# Patient Record
Sex: MALE | Race: WHITE | NOT HISPANIC OR LATINO
[De-identification: names, ages, dates, MRNs, and addresses within clinical notes are randomized per-mention and may not be internally consistent; named-entity substitution may affect disease eponyms.]

---

## 2020-02-25 ENCOUNTER — APPOINTMENT (OUTPATIENT)
Dept: FAMILY MEDICINE | Facility: CLINIC | Age: 60
End: 2020-02-25
Payer: COMMERCIAL

## 2020-02-25 VITALS — SYSTOLIC BLOOD PRESSURE: 110 MMHG | DIASTOLIC BLOOD PRESSURE: 70 MMHG

## 2020-02-25 VITALS
SYSTOLIC BLOOD PRESSURE: 114 MMHG | HEIGHT: 72 IN | OXYGEN SATURATION: 97 % | DIASTOLIC BLOOD PRESSURE: 82 MMHG | HEART RATE: 75 BPM | WEIGHT: 198 LBS | BODY MASS INDEX: 26.82 KG/M2

## 2020-02-25 DIAGNOSIS — Z87.898 PERSONAL HISTORY OF OTHER SPECIFIED CONDITIONS: ICD-10-CM

## 2020-02-25 DIAGNOSIS — S89.91XA UNSPECIFIED INJURY OF RIGHT LOWER LEG, INITIAL ENCOUNTER: ICD-10-CM

## 2020-02-25 DIAGNOSIS — I49.3 VENTRICULAR PREMATURE DEPOLARIZATION: ICD-10-CM

## 2020-02-25 DIAGNOSIS — Z84.1 FAMILY HISTORY OF DISORDERS OF KIDNEY AND URETER: ICD-10-CM

## 2020-02-25 PROCEDURE — 99204 OFFICE O/P NEW MOD 45 MIN: CPT

## 2020-02-25 RX ORDER — PSYLLIUM HUSK 0.4 G
CAPSULE ORAL
Refills: 0 | Status: ACTIVE | COMMUNITY

## 2020-02-25 RX ORDER — HYDROCODONE BITARTRATE AND ACETAMINOPHEN 5; 300 MG/1; MG/1
TABLET ORAL
Refills: 0 | Status: ACTIVE | COMMUNITY

## 2020-02-25 RX ORDER — PNV NO.95/FERROUS FUM/FOLIC AC 28MG-0.8MG
TABLET ORAL
Refills: 0 | Status: ACTIVE | COMMUNITY

## 2020-02-25 RX ORDER — VITAMIN E ACID SUCCINATE 268 MG
TABLET ORAL
Refills: 0 | Status: ACTIVE | COMMUNITY

## 2020-02-25 RX ORDER — GLUCOSAM/MSM/C/MAN/WILLOW/GING 500-333.3
TABLET ORAL
Refills: 0 | Status: ACTIVE | COMMUNITY

## 2020-02-25 RX ORDER — CHROMIUM 200 MCG
TABLET ORAL
Refills: 0 | Status: ACTIVE | COMMUNITY

## 2020-02-25 NOTE — HEALTH RISK ASSESSMENT
[0] : 2) Feeling down, depressed, or hopeless: Not at all (0) [Yes] : Yes [2 - 4 times a month (2 pts)] : 2-4 times a month (2 points) [1 or 2 (0 pts)] : 1 or 2 (0 points) [Never (0 pts)] : Never (0 points) [No] : In the past 12 months have you used drugs other than those required for medical reasons? No [] : No [de-identified] : exercises regularly  [de-identified] : diet is good  [Audit-CScore] : 2

## 2020-02-25 NOTE — HISTORY OF PRESENT ILLNESS
[de-identified] : \par 59 year old male presents with his wife as a new patient to establish care\par \par Was away in [place]\par Had a fall on a wet floor in the lobby waiting for the elevator\par slipped and fell back wards\par developed sudden sharp pain to his right leg, left arm, left hip\par was taken via ambulance to a hospital in [facility]\par had x-rays done\par found to have a fracture to his right knee cap\par was placed in a cast to his right leg\par returned home last night\par had been prescribed Lovenox injections which his wife game to him \par since being home- has been off of Lovenox \par \par has bruising, pain, and swelling to his left arm- likely with torn ligament/tendon\par \par also with pain to his left leg- more posterior in location\par may be referred from his lower back or hip\par \par has appointment with orthopedics on Thursday- 2/27/2020 \par will look to get work up, imaging with orthopedics \par \par patient has chronic neck pain\par reports h/o cervical disc herniations\par has taken Vicodin at times for severe pain- not currently following with pain management\par will discuss pain medication with orthopedics\par \par has taken Diazepam previously as well for pain relief\par requests refill- has not been sleeping well due to the pain \par denies any reactions from this medication \par \par h/o PVCs\par follows with cardiology yearly\par on magnesium supplementation \par \par used to follow with Dr. Breezy Luke until he retired (came to find out he has passed away recently)\par was then seen by his nephew- did not wish to continue with the practice\par needs new PCP [FreeTextEntry1] : Establish care

## 2020-02-25 NOTE — ASSESSMENT
[FreeTextEntry1] : Fall, Injury to right knee (knee fracture), pain to left upper extremity, pain to left lower extremity- \par await orthopedics evaluation \par will defer management to orthopedics\par refill given for Diazepam, take as directed when needed,  reviewed\par patient has Meloxicam at home- to take when needed\par \par Chronic cervical pain:\par h/o herniated cervical discs\par consider pain management\par \par PVCs:\par follows with cardiology\par on Magnesium supplementation \par \par Health care maintenance:\par check blood work, blood drawn in office\par recommend GI follow up for colonoscopy \par up to date with Flu vaccine \par \par advised to sign release to obtain previous medical records

## 2020-02-25 NOTE — PHYSICAL EXAM
[No Acute Distress] : no acute distress [Well Nourished] : well nourished [PERRL] : pupils equal round and reactive to light [Normal Sclera/Conjunctiva] : normal sclera/conjunctiva [Well Developed] : well developed [Normal Oropharynx] : the oropharynx was normal [Normal TMs] : both tympanic membranes were normal [Normal Appearance] : was normal in appearance [Normal] : the thyroid was normal [Neck Supple] : was supple [No Respiratory Distress] : no respiratory distress  [Clear to Auscultation] : lungs were clear to auscultation bilaterally [Normal Rate] : normal rate  [Regular Rhythm] : with a regular rhythm [Normal S1, S2] : normal S1 and S2 [No Murmur] : no murmur heard [No Carotid Bruits] : no carotid bruits [No Edema] : there was no peripheral edema [Soft] : abdomen soft [Non Tender] : non-tender [Normal Bowel Sounds] : normal bowel sounds [Normal Posterior Cervical Nodes] : no posterior cervical lymphadenopathy [Normal Anterior Cervical Nodes] : no anterior cervical lymphadenopathy [No Spinal Tenderness] : no spinal tenderness [Alert and Oriented x3] : oriented to person, place, and time [de-identified] : right leg in cast; tenderness to left upper arm along with swelling; tenderness to left posterior leg [de-identified] : bruising and swelling to left upper extremity

## 2020-02-26 DIAGNOSIS — D64.9 ANEMIA, UNSPECIFIED: ICD-10-CM

## 2020-02-28 ENCOUNTER — NON-APPOINTMENT (OUTPATIENT)
Age: 60
End: 2020-02-28

## 2020-02-28 ENCOUNTER — APPOINTMENT (OUTPATIENT)
Dept: FAMILY MEDICINE | Facility: CLINIC | Age: 60
End: 2020-02-28
Payer: COMMERCIAL

## 2020-02-28 VITALS
RESPIRATION RATE: 16 BRPM | SYSTOLIC BLOOD PRESSURE: 122 MMHG | DIASTOLIC BLOOD PRESSURE: 74 MMHG | WEIGHT: 203 LBS | HEART RATE: 56 BPM | HEIGHT: 74 IN | OXYGEN SATURATION: 98 % | BODY MASS INDEX: 26.05 KG/M2
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
## 2020-02-28 LAB
25(OH)D3 SERPL-MCNC: 42.5 NG/ML
ALBUMIN SERPL ELPH-MCNC: 4.7 G/DL
ALP BLD-CCNC: 49 U/L
ALT SERPL-CCNC: 74 U/L
ANION GAP SERPL CALC-SCNC: 16 MMOL/L
AST SERPL-CCNC: 72 U/L
BASOPHILS # BLD AUTO: 0.07 K/UL
BASOPHILS NFR BLD AUTO: 0.9 %
BILIRUB SERPL-MCNC: 0.9 MG/DL
BUN SERPL-MCNC: 17 MG/DL
CALCIUM SERPL-MCNC: 8.8 MG/DL
CHLORIDE SERPL-SCNC: 105 MMOL/L
CHOLEST SERPL-MCNC: 220 MG/DL
CHOLEST/HDLC SERPL: 4.5 RATIO
CO2 SERPL-SCNC: 23 MMOL/L
CREAT SERPL-MCNC: 0.78 MG/DL
EOSINOPHIL # BLD AUTO: 0.24 K/UL
EOSINOPHIL NFR BLD AUTO: 3 %
ESTIMATED AVERAGE GLUCOSE: 120 MG/DL
FERRITIN SERPL-MCNC: 430 NG/ML
FOLATE SERPL-MCNC: >20 NG/ML
GLUCOSE SERPL-MCNC: 94 MG/DL
HAV IGM SER QL: NONREACTIVE
HBA1C MFR BLD HPLC: 5.8 %
HBV CORE IGM SER QL: NONREACTIVE
HBV SURFACE AG SER QL: NONREACTIVE
HCT VFR BLD CALC: 39.7 %
HCV AB SER QL: NONREACTIVE
HCV S/CO RATIO: 0.12 S/CO
HDLC SERPL-MCNC: 49 MG/DL
HGB BLD-MCNC: 12.3 G/DL
IMM GRANULOCYTES NFR BLD AUTO: 0.8 %
IRON SATN MFR SERPL: 38 %
IRON SERPL-MCNC: 122 UG/DL
LDLC SERPL CALC-MCNC: 143 MG/DL
LYMPHOCYTES # BLD AUTO: 1.81 K/UL
LYMPHOCYTES NFR BLD AUTO: 22.8 %
MAN DIFF?: NORMAL
MCHC RBC-ENTMCNC: 22.7 PG
MCHC RBC-ENTMCNC: 31 GM/DL
MCV RBC AUTO: 73.1 FL
MONOCYTES # BLD AUTO: 0.71 K/UL
MONOCYTES NFR BLD AUTO: 8.9 %
NEUTROPHILS # BLD AUTO: 5.06 K/UL
NEUTROPHILS NFR BLD AUTO: 63.6 %
PLATELET # BLD AUTO: 200 K/UL
POTASSIUM SERPL-SCNC: 4.4 MMOL/L
PROT SERPL-MCNC: 6.8 G/DL
PSA SERPL-MCNC: 0.98 NG/ML
RBC # BLD: 5.43 M/UL
RBC # FLD: 17.6 %
SODIUM SERPL-SCNC: 143 MMOL/L
T4 FREE SERPL-MCNC: 0.9 NG/DL
TIBC SERPL-MCNC: 322 UG/DL
TRIGL SERPL-MCNC: 139 MG/DL
TSH SERPL-ACNC: 2.99 UIU/ML
UIBC SERPL-MCNC: 200 UG/DL
URATE SERPL-MCNC: 6.9 MG/DL
VIT B12 SERPL-MCNC: 689 PG/ML
WBC # FLD AUTO: 7.95 K/UL

## 2020-02-28 PROCEDURE — 93000 ELECTROCARDIOGRAM COMPLETE: CPT

## 2020-02-28 PROCEDURE — 99214 OFFICE O/P EST MOD 30 MIN: CPT | Mod: 25

## 2020-02-28 NOTE — PHYSICAL EXAM
[No Acute Distress] : no acute distress [Well-Appearing] : well-appearing [Normal Sclera/Conjunctiva] : normal sclera/conjunctiva [PERRL] : pupils equal round and reactive to light [Normal Outer Ear/Nose] : the outer ears and nose were normal in appearance [No Lymphadenopathy] : no lymphadenopathy [Normal Oropharynx] : the oropharynx was normal [No Respiratory Distress] : no respiratory distress  [Supple] : supple [No Accessory Muscle Use] : no accessory muscle use [Clear to Auscultation] : lungs were clear to auscultation bilaterally [Normal Rate] : normal rate  [Regular Rhythm] : with a regular rhythm [Normal S1, S2] : normal S1 and S2 [Soft] : abdomen soft [Non Tender] : non-tender [Non-distended] : non-distended [No Rash] : no rash [Normal Bowel Sounds] : normal bowel sounds [No Focal Deficits] : no focal deficits [Normal Affect] : the affect was normal [Normal Insight/Judgement] : insight and judgment were intact

## 2020-03-02 LAB
APPEARANCE: CLEAR
APTT BLD: 29.2 SEC
BILIRUBIN URINE: NEGATIVE
BLOOD URINE: NEGATIVE
COLOR: YELLOW
GLUCOSE QUALITATIVE U: NEGATIVE
INR PPP: 1.01 RATIO
KETONES URINE: NEGATIVE
LEUKOCYTE ESTERASE URINE: NEGATIVE
NITRITE URINE: NEGATIVE
PH URINE: 6
PROTEIN URINE: NORMAL
PT BLD: 11.4 SEC
SPECIFIC GRAVITY URINE: 1.03
TESTOST BND SERPL-MCNC: 5.9 PG/ML
TESTOST SERPL-MCNC: 157 NG/DL
UROBILINOGEN URINE: NORMAL

## 2020-03-02 NOTE — PLAN
[FreeTextEntry1] : Moderate risk pt for intermediate risk procedure. Mild anemia is noted on labs with h/h 12.3/39.7 which is likely secondary to hematoma. He is medically optimized at this time pending coagulation studies.\par Advised to stop taking vitamins and NSAIDS.

## 2020-03-02 NOTE — REVIEW OF SYSTEMS
[Joint Pain] : joint pain [Muscle Pain] : muscle pain [Fever] : no fever [Fatigue] : no fatigue [Chills] : no chills [Vision Problems] : no vision problems [Discharge] : no discharge [Nasal Discharge] : no nasal discharge [Earache] : no earache [Chest Pain] : no chest pain [Sore Throat] : no sore throat [Palpitations] : no palpitations [Shortness Of Breath] : no shortness of breath [Wheezing] : no wheezing [Abdominal Pain] : no abdominal pain [Cough] : no cough [Nausea] : no nausea [Diarrhea] : no diarrhea [Vomiting] : no vomiting [Dysuria] : no dysuria [Hematuria] : no hematuria [Headache] : no headache [Dizziness] : no dizziness [Anxiety] : no anxiety [Depression] : no depression [Fainting] : no fainting [Easy Bruising] : no easy bruising [Easy Bleeding] : no easy bleeding

## 2020-03-02 NOTE — RESULTS/DATA
[] : results reviewed [de-identified] : mild anemia likely secondary to hematoma  [de-identified] : NSR at 65bpm without ST/T wave changes

## 2020-03-02 NOTE — ASSESSMENT
[Patient Requires Further Testing] : Patient requires further testing [As per surgery] : as per surgery [FreeTextEntry4] : Moderate risk pt for intermediate risk procedure. Mild anemia is noted on labs with h/h 12.3/39.7 which is likely secondary to hematoma. He is medically optimized at this time pending coagulation studies.\par

## 2020-03-02 NOTE — HISTORY OF PRESENT ILLNESS
[No Pertinent Cardiac History] : no history of aortic stenosis, atrial fibrillation, coronary artery disease, recent myocardial infarction, or implantable device/pacemaker [No Pertinent Pulmonary History] : no history of asthma, COPD, sleep apnea, or smoking [No Adverse Anesthesia Reaction] : no adverse anesthesia reaction in self or family member [(Patient denies any chest pain, claudication, dyspnea on exertion, orthopnea, palpitations or syncope)] : Patient denies any chest pain, claudication, dyspnea on exertion, orthopnea, palpitations or syncope [Excellent (>10 METs)] : Excellent (>10 METs) [Aortic Stenosis] : no aortic stenosis [Coronary Artery Disease] : no coronary artery disease [Atrial Fibrillation] : no atrial fibrillation [Recent Myocardial Infarction] : no recent myocardial infarction [Implantable Device/Pacemaker] : no implantable device/pacemaker [Asthma] : no asthma [COPD] : no COPD [Smoker] : not a smoker [Sleep Apnea] : no sleep apnea [Self] : no previous adverse anesthesia reaction [Chronic Anticoagulation] : no chronic anticoagulation [Diabetes] : no diabetes [Chronic Kidney Disease] : no chronic kidney disease [FreeTextEntry1] : L tricep, R quad repair  [FreeTextEntry3] : Dr. Goyo Aleman  [FreeTextEntry4] : 59 year male presents for medical clearance for urgent surgery for L triceps and R quadriceps repair. States he was on vacation and slipped on wet floor in elevator, now with above injuries requiring immediate surgery. He otherwise feels well. He is extremely athletic and exercises 5-6x a week. Walked 7 miles in one day on the beach prior to injury. Can walk up multiple fights of stairs without chest pain or SOB. He has a hx of PVCs for which he saw cardiology for in 2015 and now takes magnesium supplementation. Has not had cardiac issue in past 5 years since taking supplements. He takes diazepam PRN for sleep at night, does not take regularly.  [FreeTextEntry2] : 3/2/20

## 2020-06-30 ENCOUNTER — APPOINTMENT (OUTPATIENT)
Dept: FAMILY MEDICINE | Facility: CLINIC | Age: 60
End: 2020-06-30
Payer: COMMERCIAL

## 2020-06-30 VITALS
WEIGHT: 193 LBS | RESPIRATION RATE: 16 BRPM | BODY MASS INDEX: 24.77 KG/M2 | HEIGHT: 74 IN | HEART RATE: 77 BPM | OXYGEN SATURATION: 98 % | DIASTOLIC BLOOD PRESSURE: 72 MMHG | SYSTOLIC BLOOD PRESSURE: 122 MMHG

## 2020-06-30 DIAGNOSIS — M79.605 PAIN IN LEFT LEG: ICD-10-CM

## 2020-06-30 PROCEDURE — 99214 OFFICE O/P EST MOD 30 MIN: CPT

## 2020-06-30 RX ORDER — DIAZEPAM 10 MG/1
10 TABLET ORAL
Qty: 30 | Refills: 0 | Status: COMPLETED | COMMUNITY
Start: 2019-12-27 | End: 2020-06-30

## 2020-06-30 RX ORDER — OMEGA-3/DHA/EPA/FISH OIL 300-1000MG
400 CAPSULE ORAL
Qty: 90 | Refills: 3 | Status: ACTIVE | COMMUNITY
Start: 2020-06-30

## 2020-07-02 PROBLEM — M79.605 PAIN OF LEFT LOWER EXTREMITY: Status: ACTIVE | Noted: 2020-02-25

## 2020-07-02 NOTE — HISTORY OF PRESENT ILLNESS
[de-identified] : Pt recuperating from surgical repair of L tricep and R quad repair in 3/2/20. Pt going to physical therapy. Pt had been prescribed dilaudid but now off opioid meds. Pt taking tylenol daily for pain control. Pt has R low back pain w/ radiculopathy, MRI lumbar spine is pending. Pt on valium prn at night to help w/ spasms. \par Pt has BPH w/ nocturia 3x a night. pt had been on sildenafil 20mg every other day which helped symptoms in the past.

## 2020-07-02 NOTE — ASSESSMENT
[FreeTextEntry1] : BPH - cialis 5mg q day ordered. pt reports sildenafil 20mg every other day was effective in the past\par cervical disc herniation, pain LLE, LUE, spasms - refill diazepam prn\par

## 2020-09-22 ENCOUNTER — APPOINTMENT (OUTPATIENT)
Dept: FAMILY MEDICINE | Facility: CLINIC | Age: 60
End: 2020-09-22
Payer: COMMERCIAL

## 2020-09-22 VITALS
HEIGHT: 74 IN | DIASTOLIC BLOOD PRESSURE: 88 MMHG | SYSTOLIC BLOOD PRESSURE: 120 MMHG | OXYGEN SATURATION: 98 % | WEIGHT: 193 LBS | BODY MASS INDEX: 24.77 KG/M2 | HEART RATE: 69 BPM | TEMPERATURE: 97.7 F

## 2020-09-22 DIAGNOSIS — R51 HEADACHE: ICD-10-CM

## 2020-09-22 DIAGNOSIS — H53.9 UNSPECIFIED VISUAL DISTURBANCE: ICD-10-CM

## 2020-09-22 PROCEDURE — 99214 OFFICE O/P EST MOD 30 MIN: CPT

## 2020-09-22 NOTE — HISTORY OF PRESENT ILLNESS
[FreeTextEntry8] : Pt c/o vision changes, sensitivity to light, new onset headaches that started after falling and hitting his head in 2/2020. Pt has poor ability to focus w/ his R eye vision. \par Pt still c/o BPH symptoms of nocturia 3+ times a night. Pt has not tried cialis as it cost to much. Pt reports he was on sildenafil every other day w/ good results. Pt hesitant to start flomax as he is worried about AEs.\par \par Pt still in significant pain from R quadricep repair in 03/2020. Pt has been going to PT with minimal improvement.

## 2020-09-22 NOTE — ASSESSMENT
[FreeTextEntry1] : new onset headaches, vision changes- pt to see ophtho for eval. if eye exam wnl will order imaging of head\par BPH - Trial of sildenafil every other day. Advised pt half life may not last to next day and that cialis 5mg q day or flomax qhs may be a better option in the future. Refer to uro

## 2021-01-07 ENCOUNTER — APPOINTMENT (OUTPATIENT)
Dept: FAMILY MEDICINE | Facility: CLINIC | Age: 61
End: 2021-01-07
Payer: COMMERCIAL

## 2021-01-07 DIAGNOSIS — J01.90 ACUTE SINUSITIS, UNSPECIFIED: ICD-10-CM

## 2021-01-07 DIAGNOSIS — U07.1 COVID-19: ICD-10-CM

## 2021-01-07 PROCEDURE — 99213 OFFICE O/P EST LOW 20 MIN: CPT | Mod: 95

## 2021-01-07 NOTE — HISTORY OF PRESENT ILLNESS
[Home] : at home, [unfilled] , at the time of the visit. [Medical Office: ([facility])___] : at the medical office located in  [Verbal consent obtained from patient] : the patient, [unfilled] [FreeTextEntry8] : covid positve stable no fever or respiratory  issues independent

## 2021-01-07 NOTE — REVIEW OF SYSTEMS
[Postnasal Drip] : postnasal drip [Negative] : ENT [Nasal Discharge] : no nasal discharge [Hoarseness] : no hoarseness

## 2021-01-09 ENCOUNTER — TRANSCRIPTION ENCOUNTER (OUTPATIENT)
Age: 61
End: 2021-01-09

## 2021-04-17 ENCOUNTER — TRANSCRIPTION ENCOUNTER (OUTPATIENT)
Age: 61
End: 2021-04-17

## 2021-05-20 ENCOUNTER — APPOINTMENT (OUTPATIENT)
Dept: FAMILY MEDICINE | Facility: CLINIC | Age: 61
End: 2021-05-20
Payer: COMMERCIAL

## 2021-05-20 VITALS
TEMPERATURE: 97.8 F | HEART RATE: 86 BPM | DIASTOLIC BLOOD PRESSURE: 80 MMHG | WEIGHT: 192 LBS | HEIGHT: 74 IN | OXYGEN SATURATION: 98 % | SYSTOLIC BLOOD PRESSURE: 130 MMHG | BODY MASS INDEX: 24.64 KG/M2

## 2021-05-20 DIAGNOSIS — I49.9 CARDIAC ARRHYTHMIA, UNSPECIFIED: ICD-10-CM

## 2021-05-20 DIAGNOSIS — L30.9 DERMATITIS, UNSPECIFIED: ICD-10-CM

## 2021-05-20 DIAGNOSIS — S10.96XA INSECT BITE OF UNSPECIFIED PART OF NECK, INITIAL ENCOUNTER: ICD-10-CM
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
## 2021-05-20 DIAGNOSIS — Z23 ENCOUNTER FOR IMMUNIZATION: ICD-10-CM

## 2021-05-20 PROCEDURE — 90471 IMMUNIZATION ADMIN: CPT

## 2021-05-20 PROCEDURE — 99072 ADDL SUPL MATRL&STAF TM PHE: CPT

## 2021-05-20 PROCEDURE — 90715 TDAP VACCINE 7 YRS/> IM: CPT

## 2021-05-20 PROCEDURE — 99213 OFFICE O/P EST LOW 20 MIN: CPT | Mod: 25

## 2021-05-20 PROCEDURE — 99396 PREV VISIT EST AGE 40-64: CPT | Mod: 25

## 2021-05-20 NOTE — HEALTH RISK ASSESSMENT
[Patient reported colonoscopy was abnormal] : Patient reported colonoscopy was abnormal [ColonoscopyDate] : 09/20 [ColonoscopyComments] : 3 adenomas, repeat in 2023

## 2021-05-20 NOTE — HISTORY OF PRESENT ILLNESS
[de-identified] : Pt in office for CPE. Pt has been feeling well. \par Pt has hxfrom surgical repair of L tricep and R quad repair in 3/2/20. Pt taking tylenol prn for pain control. Pt still has torn L labrum and is awaiting approval from insurance. Pt also has chronic neck pain. Pt on valium prn at night to help w/ spasms. \par Pt found tick attached to his neck 2 weeks ago. Denies new arthralgias/myalgias.\par Pt c/o rash on penis first noticed 2 days ago. Pt had started amoxicillin a few days prior, pt has hx of PCN allergy. Pt c/o headache that started 2-3 weeks ago. Denies itching or pain, fever, chills. \par Pt has hx of\par Pt sees cardio Dr. Triplett for hx cardiac arrhythmia, palpitations. Symptoms improved after starting magnesium supplement. \par Denies CP, palpitations, dyspnea, n/v.\par Nonsmoker\par ETOH use social \par Drug use denies\par Exercises 4-5x a week weights or biking\par Works as real estate company owner\par

## 2021-05-20 NOTE — ASSESSMENT
[FreeTextEntry1] : L shoulder pain, neck spasms - diazepam prn.  checked\par BPH - check PSA, refer to uro\par dermatitis of penis - trial of topical betamethasone x 1 week. if no improvement see derm\par tick bite of neck - tick borne disease panel ordered\par Healthy diet and regular exercise regimen discussed w/ pt.\par Screening labs ordered\par tdap administered, pt consent given before administration and after discussion of risks/benefits, pt tolerated well\par Any questions call office

## 2021-05-24 ENCOUNTER — TRANSCRIPTION ENCOUNTER (OUTPATIENT)
Age: 61
End: 2021-05-24

## 2021-05-24 LAB
A PHAGOCYTOPH IGG TITR SER IF: NORMAL TITER
ALBUMIN SERPL ELPH-MCNC: 4.9 G/DL
ALP BLD-CCNC: 48 U/L
ALT SERPL-CCNC: 17 U/L
ANION GAP SERPL CALC-SCNC: 16 MMOL/L
APPEARANCE: CLEAR
AST SERPL-CCNC: 16 U/L
B BURGDOR AB SER QL IA: NEGATIVE
B MICROTI IGG TITR SER: NORMAL TITER
BACTERIA: NEGATIVE
BASOPHILS # BLD AUTO: 0.05 K/UL
BASOPHILS NFR BLD AUTO: 0.8 %
BILIRUB SERPL-MCNC: 1.1 MG/DL
BILIRUBIN URINE: NEGATIVE
BLOOD URINE: NEGATIVE
BUN SERPL-MCNC: 19 MG/DL
CALCIUM OXALATE CRYSTALS: ABNORMAL
CALCIUM SERPL-MCNC: 9.2 MG/DL
CHLORIDE SERPL-SCNC: 101 MMOL/L
CHOLEST SERPL-MCNC: 176 MG/DL
CO2 SERPL-SCNC: 24 MMOL/L
COLOR: YELLOW
CREAT SERPL-MCNC: 0.99 MG/DL
E CHAFFEENSIS IGG TITR SER IF: NORMAL TITER
EOSINOPHIL # BLD AUTO: 0.26 K/UL
EOSINOPHIL NFR BLD AUTO: 4.3 %
ESTIMATED AVERAGE GLUCOSE: 120 MG/DL
GLUCOSE QUALITATIVE U: NEGATIVE
GLUCOSE SERPL-MCNC: 103 MG/DL
HBA1C MFR BLD HPLC: 5.8 %
HCT VFR BLD CALC: 45.2 %
HDLC SERPL-MCNC: 45 MG/DL
HGB BLD-MCNC: 13.5 G/DL
HYALINE CASTS: 1 /LPF
IMM GRANULOCYTES NFR BLD AUTO: 0.3 %
KETONES URINE: NEGATIVE
LDLC SERPL CALC-MCNC: 114 MG/DL
LEUKOCYTE ESTERASE URINE: NEGATIVE
LYMPHOCYTES # BLD AUTO: 1.43 K/UL
LYMPHOCYTES NFR BLD AUTO: 23.4 %
MAN DIFF?: NORMAL
MCHC RBC-ENTMCNC: 21.8 PG
MCHC RBC-ENTMCNC: 29.9 GM/DL
MCV RBC AUTO: 72.9 FL
MICROSCOPIC-UA: NORMAL
MONOCYTES # BLD AUTO: 0.66 K/UL
MONOCYTES NFR BLD AUTO: 10.8 %
NEUTROPHILS # BLD AUTO: 3.69 K/UL
NEUTROPHILS NFR BLD AUTO: 60.4 %
NITRITE URINE: NEGATIVE
NONHDLC SERPL-MCNC: 131 MG/DL
PH URINE: 6
PLATELET # BLD AUTO: 199 K/UL
POTASSIUM SERPL-SCNC: 4.3 MMOL/L
PROT SERPL-MCNC: 7.3 G/DL
PROTEIN URINE: ABNORMAL
PSA SERPL-MCNC: 1.32 NG/ML
RBC # BLD: 6.2 M/UL
RBC # FLD: 17.5 %
RED BLOOD CELLS URINE: 1 /HPF
SODIUM SERPL-SCNC: 140 MMOL/L
SPECIFIC GRAVITY URINE: 1.03
SQUAMOUS EPITHELIAL CELLS: 0 /HPF
T4 FREE SERPL-MCNC: 1 NG/DL
TESTOST SERPL-MCNC: 157 NG/DL
TRIGL SERPL-MCNC: 82 MG/DL
TSH SERPL-ACNC: 2.39 UIU/ML
UROBILINOGEN URINE: NORMAL
WBC # FLD AUTO: 6.11 K/UL
WHITE BLOOD CELLS URINE: 2 /HPF

## 2021-08-03 ENCOUNTER — TRANSCRIPTION ENCOUNTER (OUTPATIENT)
Age: 61
End: 2021-08-03

## 2021-08-05 ENCOUNTER — APPOINTMENT (OUTPATIENT)
Dept: FAMILY MEDICINE | Facility: CLINIC | Age: 61
End: 2021-08-05
Payer: COMMERCIAL

## 2021-08-05 VITALS
HEART RATE: 87 BPM | HEIGHT: 74 IN | TEMPERATURE: 97.5 F | SYSTOLIC BLOOD PRESSURE: 122 MMHG | OXYGEN SATURATION: 97 % | BODY MASS INDEX: 24.77 KG/M2 | WEIGHT: 193 LBS | DIASTOLIC BLOOD PRESSURE: 78 MMHG

## 2021-08-05 DIAGNOSIS — M25.50 PAIN IN UNSPECIFIED JOINT: ICD-10-CM
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
## 2021-08-05 DIAGNOSIS — S30.861A INSECT BITE (NONVENOMOUS) OF ABDOMINAL WALL, INITIAL ENCOUNTER: ICD-10-CM

## 2021-08-05 PROCEDURE — 99214 OFFICE O/P EST MOD 30 MIN: CPT

## 2021-08-12 NOTE — HISTORY OF PRESENT ILLNESS
[FreeTextEntry8] : Pt c/o generalized arthralgias, myalgias for past 4 days. Pt has worsened neck pain and spasms as well. Pt uses valium prn. Pt was taking motrin prn. Pt went to urgent care on 8/3/21, COVID rapid test was neg. Symptoms have improved today. Pt reports removing tick from R groin 2-3 weeks ago. Denies fever, chills, n/v, abd pain, sinus/throat pain, cough, dyspnea, CP.\par Pt also c/o 1-2 months of L lateral rib pains. Denies dyspnea or relation to activity. Breath sounds clear and equal bilaterally. (3) walks occasionally

## 2021-08-12 NOTE — PHYSICAL EXAM
[Soft] : abdomen soft [Non Tender] : non-tender [Non-distended] : non-distended [No Rash] : no rash [Normal] : affect was normal and insight and judgment were intact [de-identified] : no reproducible rib or chest pain on palpation

## 2021-08-12 NOTE — ASSESSMENT
[FreeTextEntry1] : polyarthralgias, hx tick bite of groin - lyme test in 2-3 weeks. As symptoms are improving may possibly be viral, will hold off on antibx for now. If symptoms worsen will start doxy course.\par \par Addendum 8/12/21\par Patient condition worsened, symptoms worsened. start doxycycline 100mg bid x 21 days for presumed lyme

## 2021-08-21 ENCOUNTER — INPATIENT (INPATIENT)
Facility: HOSPITAL | Age: 61
LOS: 3 days | Discharge: HOME HEALTH | End: 2021-08-25
Attending: FAMILY MEDICINE
Payer: COMMERCIAL

## 2021-08-21 DIAGNOSIS — S46.311A STRAIN OF MUSCLE, FASCIA AND TENDON OF TRICEPS, RIGHT ARM, INITIAL ENCOUNTER: Chronic | ICD-10-CM

## 2021-08-21 DIAGNOSIS — T14.8 OTHER INJURY OF UNSPECIFIED BODY REGION: Chronic | ICD-10-CM

## 2021-08-21 DIAGNOSIS — Z98.89 OTHER SPECIFIED POSTPROCEDURAL STATES: Chronic | ICD-10-CM

## 2021-08-21 PROCEDURE — 71045 X-RAY EXAM CHEST 1 VIEW: CPT | Mod: 26

## 2021-08-21 PROCEDURE — 99284 EMERGENCY DEPT VISIT MOD MDM: CPT

## 2021-08-21 PROCEDURE — 70450 CT HEAD/BRAIN W/O DYE: CPT | Mod: 26

## 2021-08-21 PROCEDURE — 93010 ELECTROCARDIOGRAM REPORT: CPT

## 2021-08-30 ENCOUNTER — NON-APPOINTMENT (OUTPATIENT)
Age: 61
End: 2021-08-30

## 2021-09-07 ENCOUNTER — APPOINTMENT (OUTPATIENT)
Dept: FAMILY MEDICINE | Facility: CLINIC | Age: 61
End: 2021-09-07
Payer: COMMERCIAL

## 2021-09-07 VITALS — DIASTOLIC BLOOD PRESSURE: 84 MMHG | SYSTOLIC BLOOD PRESSURE: 112 MMHG

## 2021-09-07 VITALS
WEIGHT: 196 LBS | BODY MASS INDEX: 25.15 KG/M2 | TEMPERATURE: 97.7 F | HEART RATE: 80 BPM | OXYGEN SATURATION: 98 % | SYSTOLIC BLOOD PRESSURE: 120 MMHG | HEIGHT: 74 IN | DIASTOLIC BLOOD PRESSURE: 80 MMHG

## 2021-09-07 DIAGNOSIS — M54.2 CERVICALGIA: ICD-10-CM

## 2021-09-07 DIAGNOSIS — G89.29 CERVICALGIA: ICD-10-CM

## 2021-09-07 PROCEDURE — 99495 TRANSJ CARE MGMT MOD F2F 14D: CPT

## 2021-09-08 PROBLEM — M54.2 CHRONIC CERVICAL PAIN: Status: ACTIVE | Noted: 2020-02-25

## 2021-09-08 NOTE — ASSESSMENT
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  checked\par insomnia - refill ambien prn. Advised not to take ambien if he had already taken diazepam that day due to risk of interactions. Pt understands on repeat back.  checked

## 2021-09-08 NOTE — HISTORY OF PRESENT ILLNESS
[Post-hospitalization from ___ Hospital] : Post-hospitalization from [unfilled] Hospital [Admitted on: ___] : The patient was admitted on [unfilled] [Discharged on ___] : discharged on [unfilled] [Discharge Summary] : discharge summary [Pertinent Labs] : pertinent labs [Radiology Findings] : radiology findings [Discharge Med List] : discharge medication list [Med Reconciliation] : medication reconciliation has been completed [Patient Contacted By: ____] : and contacted by [unfilled] [FreeTextEntry2] : Pt was admitted for dizziness, blurry vision, headaches. 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 Pt to see ID Dr. Gamble for followup. Pt on IV ceftriaxone until 9/20/21. Pt still has residual lightheadedness at times when getting up quickly and feels fatigued.\par \par Pt also f/u chronic cervical and shoulder spasms/pain. Pt uses diazepam prn spasms. Pt f/u insomnia, uses ambien rarely.

## 2021-09-08 NOTE — PHYSICAL EXAM
[Soft] : abdomen soft [Non Tender] : non-tender [No Rash] : no rash [No Focal Deficits] : no focal deficits [Normal Gait] : normal gait [Normal] : affect was normal and insight and judgment were intact

## 2021-10-05 ENCOUNTER — APPOINTMENT (OUTPATIENT)
Dept: UROLOGY | Facility: CLINIC | Age: 61
End: 2021-10-05
Payer: COMMERCIAL

## 2021-10-05 VITALS
HEART RATE: 73 BPM | HEIGHT: 74 IN | BODY MASS INDEX: 25.15 KG/M2 | DIASTOLIC BLOOD PRESSURE: 84 MMHG | WEIGHT: 196 LBS | SYSTOLIC BLOOD PRESSURE: 147 MMHG

## 2021-10-05 DIAGNOSIS — N13.8 BENIGN PROSTATIC HYPERPLASIA WITH LOWER URINARY TRACT SYMPMS: ICD-10-CM

## 2021-10-05 DIAGNOSIS — R35.1 NOCTURIA: ICD-10-CM

## 2021-10-05 DIAGNOSIS — N40.1 BENIGN PROSTATIC HYPERPLASIA WITH LOWER URINARY TRACT SYMPMS: ICD-10-CM

## 2021-10-05 PROCEDURE — 99204 OFFICE O/P NEW MOD 45 MIN: CPT

## 2021-10-05 PROCEDURE — 51798 US URINE CAPACITY MEASURE: CPT

## 2021-10-06 LAB
APPEARANCE: CLEAR
BILIRUBIN URINE: NEGATIVE
BLOOD URINE: NEGATIVE
COLOR: YELLOW
GLUCOSE QUALITATIVE U: NEGATIVE
KETONES URINE: NEGATIVE
LEUKOCYTE ESTERASE URINE: NEGATIVE
NITRITE URINE: NEGATIVE
PH URINE: 6
PROTEIN URINE: NEGATIVE
SPECIFIC GRAVITY URINE: 1.02
UROBILINOGEN URINE: NORMAL

## 2021-10-13 PROBLEM — R35.1 NOCTURIA: Status: ACTIVE | Noted: 2021-10-13

## 2021-10-13 NOTE — ASSESSMENT
[FreeTextEntry1] : Benign Prostatic Hyperplasia:\par PVR: 32 ml. \par Discussed treatment options. Will like to hold off considering his dizziness issues. \par Will get Urinalysis with reflex Urine culture. \par \par Nocturia:\par Asked Pt to limit PM fluid intake. \par \par Hypogonadism:\par Discussed risks and benefits of Testosterone replacement therapy including effect on spermatogenesis. \par Will consider. \par Will get Total, Free and Bio available Testosterone. \par \par PSA Screening:\par Discussed PSA screening and latest recommendations/guidelines- USPTF and AUA. \par Continue PSA Screening. \par \par Return to office in 2 months or sooner if any issues: will do Uroflo/PVR.

## 2021-10-13 NOTE — HISTORY OF PRESENT ILLNESS
[FreeTextEntry1] : 61 year old male presents for urinary frequency. \par Reports reasonable stream, urinates every 2-4 hours or so during the day. Nocturia of 3-5 x. \par Denies hesitancy, straining, intermittency, urgency, incontinence, sense of incomplete emptying.\par Denies dysuria, hematuria, lower abdominal or flank pain, fever, chills or rigors.\par Normal erections and libido. \par Family history of Prostate cancer: ? Adopted.\par Son has history of kidney stone. \par \par Recently had Meningitis. Has dizziness since diagnosed. \par  \par

## 2021-10-13 NOTE — PHYSICAL EXAM
[Normal Appearance] : normal appearance [General Appearance - In No Acute Distress] : no acute distress [] : no respiratory distress [Abdomen Soft] : soft [Abdomen Tenderness] : non-tender [Costovertebral Angle Tenderness] : no ~M costovertebral angle tenderness [Urethral Meatus] : meatus normal [Penis Abnormality] : normal uncircumcised penis [Scrotum] : the scrotum was normal [Epididymis] : the epididymides were normal [Testes Tenderness] : no tenderness of the testes [Normal Station and Gait] : the gait and station were normal for the patient's age [Skin Color & Pigmentation] : normal skin color and pigmentation [No Focal Deficits] : no focal deficits [Oriented To Time, Place, And Person] : oriented to person, place, and time [Prostate Tenderness] : the prostate was not tender [No Prostate Nodules] : no prostate nodules [Prostate Size ___ (0-4)] : prostate size [unfilled] (scale: 0-4) [FreeTextEntry1] : left varicocele

## 2021-11-03 ENCOUNTER — TRANSCRIPTION ENCOUNTER (OUTPATIENT)
Age: 61
End: 2021-11-03

## 2021-12-07 ENCOUNTER — APPOINTMENT (OUTPATIENT)
Dept: UROLOGY | Facility: CLINIC | Age: 61
End: 2021-12-07
Payer: COMMERCIAL

## 2021-12-07 VITALS — TEMPERATURE: 98 F

## 2021-12-07 PROCEDURE — 51741 ELECTRO-UROFLOWMETRY FIRST: CPT

## 2021-12-07 PROCEDURE — 99213 OFFICE O/P EST LOW 20 MIN: CPT | Mod: 25

## 2021-12-07 PROCEDURE — 51798 US URINE CAPACITY MEASURE: CPT

## 2021-12-07 NOTE — HISTORY OF PRESENT ILLNESS
[FreeTextEntry1] : 61 year old male presents for follow up. \par Did not do labs. Same urination, not bothered. \par Still has dizziness. \par \par Initially seen for urinary frequency. \par Reported reasonable stream, urinated every 2-4 hours or so during the day. Nocturia of 3-5 x. \par Denied hesitancy, straining, intermittency, urgency, incontinence, sense of incomplete emptying.\par Denied dysuria, hematuria, lower abdominal or flank pain, fever, chills or rigors.\par Normal erections and libido. \par Family history of Prostate cancer: ? Adopted.\par Son has history of kidney stone. \par \par Recently had Meningitis. Had dizziness since diagnosed. \par  \par

## 2021-12-07 NOTE — ASSESSMENT
[FreeTextEntry1] : Benign Prostatic Hyperplasia:\par See Uroflo and PVR. \par Discussed that he has some LUTs which probably are secondary to enlarged prostate and that I would like to address those first with alpha blockers. Patient wants to hold off for now has he still has dizziness. \par \par Low Testosterone:\par Will do labs. Will inform results. \par \par Return to office in 6 months or sooner if any issues.

## 2022-01-25 ENCOUNTER — APPOINTMENT (OUTPATIENT)
Dept: MRI IMAGING | Facility: CLINIC | Age: 62
End: 2022-01-25
Payer: COMMERCIAL

## 2022-01-25 ENCOUNTER — OUTPATIENT (OUTPATIENT)
Dept: OUTPATIENT SERVICES | Facility: HOSPITAL | Age: 62
LOS: 1 days | End: 2022-01-25
Payer: COMMERCIAL

## 2022-01-25 DIAGNOSIS — M25.511 PAIN IN RIGHT SHOULDER: ICD-10-CM

## 2022-01-25 DIAGNOSIS — Z98.89 OTHER SPECIFIED POSTPROCEDURAL STATES: Chronic | ICD-10-CM

## 2022-01-25 DIAGNOSIS — Z00.8 ENCOUNTER FOR OTHER GENERAL EXAMINATION: ICD-10-CM

## 2022-01-25 DIAGNOSIS — T14.8 OTHER INJURY OF UNSPECIFIED BODY REGION: Chronic | ICD-10-CM

## 2022-01-25 DIAGNOSIS — S46.311A STRAIN OF MUSCLE, FASCIA AND TENDON OF TRICEPS, RIGHT ARM, INITIAL ENCOUNTER: Chronic | ICD-10-CM

## 2022-01-25 PROCEDURE — 73221 MRI JOINT UPR EXTREM W/O DYE: CPT | Mod: 26,LT

## 2022-01-25 PROCEDURE — 73221 MRI JOINT UPR EXTREM W/O DYE: CPT

## 2022-03-21 ENCOUNTER — TRANSCRIPTION ENCOUNTER (OUTPATIENT)
Age: 62
End: 2022-03-21

## 2022-03-31 ENCOUNTER — APPOINTMENT (OUTPATIENT)
Dept: FAMILY MEDICINE | Facility: CLINIC | Age: 62
End: 2022-03-31
Payer: COMMERCIAL

## 2022-03-31 VITALS
OXYGEN SATURATION: 98 % | HEART RATE: 74 BPM | BODY MASS INDEX: 25.8 KG/M2 | DIASTOLIC BLOOD PRESSURE: 80 MMHG | HEIGHT: 74 IN | SYSTOLIC BLOOD PRESSURE: 120 MMHG | TEMPERATURE: 98.2 F | WEIGHT: 201 LBS

## 2022-03-31 DIAGNOSIS — S43.432D SUPERIOR GLENOID LABRUM LESION OF LEFT SHOULDER, SUBSEQUENT ENCOUNTER: ICD-10-CM

## 2022-03-31 DIAGNOSIS — H81.11 BENIGN PAROXYSMAL VERTIGO, RIGHT EAR: ICD-10-CM

## 2022-03-31 DIAGNOSIS — M79.602 PAIN IN LEFT ARM: ICD-10-CM

## 2022-03-31 PROCEDURE — 99214 OFFICE O/P EST MOD 30 MIN: CPT

## 2022-03-31 NOTE — HISTORY OF PRESENT ILLNESS
[de-identified] : Pt s/p L shoulder surgery for biceps reattachment, labral repair 2/14/22. Pt doing PT started 2 weeks ago. Pt uses percocet very sparingly. Pain usually controlled on motrin or tylenol. Pt using valium prn spasms.\par Pt f/u insomnia, uses ambien qhs sparingly. \par Pt still has vertigo s/p Lyme, worse in past 2 mos.

## 2022-03-31 NOTE — ASSESSMENT
[FreeTextEntry1] : R benign positional vertigo - pt given instructions on modified epley maneuvers. start meclizine. Possible adverse effects discussed with pt \par BPH - refill sildenafil\par chronic cervical pain, shoulder pain/spasms - refill diazepam prn.  checked\par insomnia - refill ambien prn. Advised not to take ambien if he had already taken diazepam that day due to risk of interactions. Pt understands on repeat back.  checked

## 2022-03-31 NOTE — PHYSICAL EXAM
[Normal] : affect was normal and insight and judgment were intact [de-identified] : neck spasms [de-identified] : +jarvis ROJAS

## 2022-05-08 ENCOUNTER — NON-APPOINTMENT (OUTPATIENT)
Age: 62
End: 2022-05-08

## 2022-06-07 ENCOUNTER — APPOINTMENT (OUTPATIENT)
Dept: UROLOGY | Facility: CLINIC | Age: 62
End: 2022-06-07

## 2022-07-20 ENCOUNTER — NON-APPOINTMENT (OUTPATIENT)
Age: 62
End: 2022-07-20

## 2022-08-17 ENCOUNTER — NON-APPOINTMENT (OUTPATIENT)
Age: 62
End: 2022-08-17

## 2022-10-10 ENCOUNTER — OFFICE (OUTPATIENT)
Dept: URBAN - METROPOLITAN AREA CLINIC 12 | Facility: CLINIC | Age: 62
Setting detail: OPHTHALMOLOGY
End: 2022-10-10
Payer: COMMERCIAL

## 2022-10-10 DIAGNOSIS — H40.013: ICD-10-CM

## 2022-10-10 DIAGNOSIS — H25.13: ICD-10-CM

## 2022-10-10 PROCEDURE — 99204 OFFICE O/P NEW MOD 45 MIN: CPT | Performed by: OPHTHALMOLOGY

## 2022-10-10 PROCEDURE — 92250 FUNDUS PHOTOGRAPHY W/I&R: CPT | Performed by: OPHTHALMOLOGY

## 2022-10-10 PROCEDURE — 92020 GONIOSCOPY: CPT | Performed by: OPHTHALMOLOGY

## 2022-10-10 ASSESSMENT — AXIALLENGTH_DERIVED
OS_AL: 27.1352
OS_AL: 27.1352
OD_AL: 28.2067
OD_AL: 28.2067

## 2022-10-10 ASSESSMENT — REFRACTION_MANIFEST
OD_CYLINDER: -2.25
OS_SPHERE: -6.00
OS_CYLINDER: -0.75
OS_AXIS: 081
OD_AXIS: 130
OD_SPHERE: -7.25

## 2022-10-10 ASSESSMENT — KERATOMETRY
OD_K2POWER_DIOPTERS: 42.75
OS_AXISANGLE_DEGREES: 015
OS_K2POWER_DIOPTERS: 42.25
OS_K1POWER_DIOPTERS: 41.50
OD_K1POWER_DIOPTERS: 41.00
OD_AXISANGLE_DEGREES: 004

## 2022-10-10 ASSESSMENT — REFRACTION_AUTOREFRACTION
OD_SPHERE: -7.25
OD_CYLINDER: -2.25
OS_AXIS: 081
OD_AXIS: 130
OS_SPHERE: -6.00
OS_CYLINDER: -0.75

## 2022-10-10 ASSESSMENT — SPHEQUIV_DERIVED
OS_SPHEQUIV: -6.375
OD_SPHEQUIV: -8.375
OD_SPHEQUIV: -8.375
OS_SPHEQUIV: -6.375

## 2022-10-10 ASSESSMENT — TONOMETRY
OS_IOP_MMHG: 20
OD_IOP_MMHG: 19

## 2022-10-10 ASSESSMENT — VISUAL ACUITY
OS_BCVA: 20/40-
OD_BCVA: 20/50-2

## 2022-10-10 ASSESSMENT — CONFRONTATIONAL VISUAL FIELD TEST (CVF)
OS_FINDINGS: FULL
OD_FINDINGS: FULL

## 2022-10-17 ENCOUNTER — OFFICE (OUTPATIENT)
Dept: URBAN - METROPOLITAN AREA CLINIC 12 | Facility: CLINIC | Age: 62
Setting detail: OPHTHALMOLOGY
End: 2022-10-17
Payer: COMMERCIAL

## 2022-10-17 DIAGNOSIS — H25.13: ICD-10-CM

## 2022-10-17 DIAGNOSIS — H40.013: ICD-10-CM

## 2022-10-17 DIAGNOSIS — H25.11: ICD-10-CM

## 2022-10-17 PROCEDURE — 92083 EXTENDED VISUAL FIELD XM: CPT | Performed by: OPHTHALMOLOGY

## 2022-10-17 PROCEDURE — 99214 OFFICE O/P EST MOD 30 MIN: CPT | Performed by: OPHTHALMOLOGY

## 2022-10-17 PROCEDURE — 92136 OPHTHALMIC BIOMETRY: CPT | Performed by: OPHTHALMOLOGY

## 2022-10-17 PROCEDURE — 92133 CPTRZD OPH DX IMG PST SGM ON: CPT | Performed by: OPHTHALMOLOGY

## 2022-10-17 ASSESSMENT — REFRACTION_MANIFEST
OD_CYLINDER: -2.25
OD_AXIS: 130
OS_CYLINDER: -0.75
OS_AXIS: 081
OS_SPHERE: -6.00
OD_SPHERE: -7.25

## 2022-10-17 ASSESSMENT — SPHEQUIV_DERIVED
OD_SPHEQUIV: -8.375
OS_SPHEQUIV: -6.375
OD_SPHEQUIV: -9.125

## 2022-10-17 ASSESSMENT — CONFRONTATIONAL VISUAL FIELD TEST (CVF)
OS_FINDINGS: FULL
OD_FINDINGS: FULL

## 2022-10-17 ASSESSMENT — KERATOMETRY
OD_AXISANGLE_DEGREES: 006
OD_K2POWER_DIOPTERS: 42.50
OD_K1POWER_DIOPTERS: 41.50

## 2022-10-17 ASSESSMENT — TONOMETRY
OS_IOP_MMHG: 17
OD_IOP_MMHG: 16

## 2022-10-17 ASSESSMENT — AXIALLENGTH_DERIVED
OD_AL: 28.5633
OD_AL: 28.1413

## 2022-10-17 ASSESSMENT — REFRACTION_AUTOREFRACTION
OD_SPHERE: -7.75
OD_CYLINDER: -2.75
OD_AXIS: 141

## 2022-10-17 ASSESSMENT — VISUAL ACUITY
OS_BCVA: 20/150
OD_BCVA: 20/60+1

## 2022-10-18 ENCOUNTER — APPOINTMENT (OUTPATIENT)
Dept: FAMILY MEDICINE | Facility: CLINIC | Age: 62
End: 2022-10-18

## 2022-10-18 VITALS
WEIGHT: 196 LBS | HEIGHT: 74 IN | BODY MASS INDEX: 25.15 KG/M2 | TEMPERATURE: 97.8 F | DIASTOLIC BLOOD PRESSURE: 80 MMHG | OXYGEN SATURATION: 97 % | HEART RATE: 78 BPM | SYSTOLIC BLOOD PRESSURE: 122 MMHG

## 2022-10-18 DIAGNOSIS — G47.00 INSOMNIA, UNSPECIFIED: ICD-10-CM

## 2022-10-18 DIAGNOSIS — H25.091 OTHER AGE-RELATED INCIPIENT CATARACT, RIGHT EYE: ICD-10-CM

## 2022-10-18 PROCEDURE — 99214 OFFICE O/P EST MOD 30 MIN: CPT

## 2022-10-18 RX ORDER — ZOLPIDEM TARTRATE 10 MG/1
10 TABLET ORAL
Qty: 30 | Refills: 0 | Status: COMPLETED | COMMUNITY
Start: 2021-09-07 | End: 2022-10-18

## 2022-10-18 NOTE — HISTORY OF PRESENT ILLNESS
[No Pertinent Cardiac History] : no history of aortic stenosis, atrial fibrillation, coronary artery disease, recent myocardial infarction, or implantable device/pacemaker [No Pertinent Pulmonary History] : no history of asthma, COPD, sleep apnea, or smoking [No Adverse Anesthesia Reaction] : no adverse anesthesia reaction in self or family member [(Patient denies any chest pain, claudication, dyspnea on exertion, orthopnea, palpitations or syncope)] : Patient denies any chest pain, claudication, dyspnea on exertion, orthopnea, palpitations or syncope [Excellent (>10 METs)] : Excellent (>10 METs) [Chronic Anticoagulation] : no chronic anticoagulation [Chronic Kidney Disease] : no chronic kidney disease [Diabetes] : no diabetes [FreeTextEntry1] : R cataract extraction and intraocular lens placement  [FreeTextEntry2] : 11/4/22 [FreeTextEntry3] : Dr. Connor [FreeTextEntry4] : Pt in office for medical clearance. Pt to go for procedure for cataract extraction and intraocular lens placement. Pt denies chest pain, palpitations, dyspnea, fever, chills, nausea, or vomiting.\par \par Pt f/u insomnia, BPH. Pt has tried klonopin in the past for insomnia which has helped. H did not tolerate ambien well. Pt using melatonins qhs which has been relatively effective. BPH contorlledo on sildenafil

## 2022-10-18 NOTE — ASSESSMENT
[FreeTextEntry4] : Pt is medically optimized for procedure at this time.\par Fax to [facility]\par \par BPH - refill sildenafil\par insomnia - cont melatonin. klonopin qhs prn.  checked

## 2022-11-01 ENCOUNTER — OFFICE (OUTPATIENT)
Dept: URBAN - METROPOLITAN AREA CLINIC 12 | Facility: CLINIC | Age: 62
Setting detail: OPHTHALMOLOGY
End: 2022-11-01
Payer: COMMERCIAL

## 2022-11-01 DIAGNOSIS — Z20.822: ICD-10-CM

## 2022-11-01 DIAGNOSIS — Z01.812: ICD-10-CM

## 2022-11-01 PROCEDURE — 99211 OFF/OP EST MAY X REQ PHY/QHP: CPT | Performed by: OPHTHALMOLOGY

## 2022-11-01 ASSESSMENT — REFRACTION_MANIFEST
OS_AXIS: 081
OD_SPHERE: -7.25
OS_SPHERE: -6.00
OD_AXIS: 130
OD_CYLINDER: -2.25
OS_CYLINDER: -0.75

## 2022-11-01 ASSESSMENT — SPHEQUIV_DERIVED
OD_SPHEQUIV: -8.375
OS_SPHEQUIV: -6.375
OD_SPHEQUIV: -9.125

## 2022-11-01 ASSESSMENT — KERATOMETRY
OD_K2POWER_DIOPTERS: 42.50
OD_AXISANGLE_DEGREES: 006
OD_K1POWER_DIOPTERS: 41.50

## 2022-11-01 ASSESSMENT — REFRACTION_AUTOREFRACTION
OD_AXIS: 141
OD_CYLINDER: -2.75
OD_SPHERE: -7.75

## 2022-11-01 ASSESSMENT — VISUAL ACUITY
OS_BCVA: 20/150
OD_BCVA: 20/60+1

## 2022-11-01 ASSESSMENT — AXIALLENGTH_DERIVED
OD_AL: 28.5633
OD_AL: 28.1413

## 2022-11-04 ENCOUNTER — AMBULATORY SURGERY CENTER (OUTPATIENT)
Dept: URBAN - METROPOLITAN AREA SURGERY 27 | Facility: SURGERY | Age: 62
Setting detail: OPHTHALMOLOGY
End: 2022-11-04
Payer: COMMERCIAL

## 2022-11-04 DIAGNOSIS — H25.11: ICD-10-CM

## 2022-11-04 DIAGNOSIS — H52.211: ICD-10-CM

## 2022-11-04 PROCEDURE — FEMTO CATARACT LASER: Performed by: OPHTHALMOLOGY

## 2022-11-04 PROCEDURE — 66984 XCAPSL CTRC RMVL W/O ECP: CPT | Performed by: OPHTHALMOLOGY

## 2022-11-05 ENCOUNTER — RX ONLY (RX ONLY)
Age: 62
End: 2022-11-05

## 2022-11-05 ENCOUNTER — OFFICE (OUTPATIENT)
Dept: URBAN - METROPOLITAN AREA CLINIC 100 | Facility: CLINIC | Age: 62
Setting detail: OPHTHALMOLOGY
End: 2022-11-05
Payer: COMMERCIAL

## 2022-11-05 DIAGNOSIS — Z96.1: ICD-10-CM

## 2022-11-05 PROCEDURE — 99024 POSTOP FOLLOW-UP VISIT: CPT | Performed by: OPTOMETRIST

## 2022-11-05 ASSESSMENT — REFRACTION_MANIFEST
OD_CYLINDER: -2.25
OD_SPHERE: -7.25
OS_AXIS: 081
OS_CYLINDER: -0.75
OS_SPHERE: -6.00
OD_AXIS: 130

## 2022-11-05 ASSESSMENT — KERATOMETRY
OD_K2POWER_DIOPTERS: 42.25
OS_AXISANGLE_DEGREES: 027
OS_K1POWER_DIOPTERS: 41.25
OD_K1POWER_DIOPTERS: 41.75
OS_K2POWER_DIOPTERS: 42.50
METHOD_AUTO_MANUAL: AUTO
OD_AXISANGLE_DEGREES: 144

## 2022-11-05 ASSESSMENT — AXIALLENGTH_DERIVED
OS_AL: 27.1352
OD_AL: 24.2072
OD_AL: 28.1413
OS_AL: 28.5592

## 2022-11-05 ASSESSMENT — REFRACTION_AUTOREFRACTION
OD_SPHERE: +0.50
OD_CYLINDER: -1.25
OD_AXIS: 089
OS_CYLINDER: -4.50
OS_AXIS: 087
OS_SPHERE: -6.75

## 2022-11-05 ASSESSMENT — TONOMETRY
OD_IOP_MMHG: 16
OS_IOP_MMHG: 13

## 2022-11-05 ASSESSMENT — SPHEQUIV_DERIVED
OS_SPHEQUIV: -9
OD_SPHEQUIV: -0.125
OD_SPHEQUIV: -8.375
OS_SPHEQUIV: -6.375

## 2022-11-05 ASSESSMENT — VISUAL ACUITY
OD_BCVA: 20/150
OS_BCVA: 20/50+

## 2022-11-05 ASSESSMENT — CONFRONTATIONAL VISUAL FIELD TEST (CVF)
OD_FINDINGS: FULL
OS_FINDINGS: FULL

## 2022-11-10 ENCOUNTER — OFFICE (OUTPATIENT)
Dept: URBAN - METROPOLITAN AREA CLINIC 12 | Facility: CLINIC | Age: 62
Setting detail: OPHTHALMOLOGY
End: 2022-11-10
Payer: COMMERCIAL

## 2022-11-10 DIAGNOSIS — H25.12: ICD-10-CM

## 2022-11-10 PROCEDURE — 92136 OPHTHALMIC BIOMETRY: CPT | Performed by: OPHTHALMOLOGY

## 2022-11-10 ASSESSMENT — KERATOMETRY
OS_AXISANGLE_DEGREES: 005
OD_K2POWER_DIOPTERS: 42.25
OS_K1POWER_DIOPTERS: 41.25
OD_AXISANGLE_DEGREES: 164
OD_K1POWER_DIOPTERS: 41.25
OS_K2POWER_DIOPTERS: 42.25
METHOD_AUTO_MANUAL: AUTO

## 2022-11-10 ASSESSMENT — AXIALLENGTH_DERIVED
OD_AL: 23.9981
OD_AL: 28.2725
OS_AL: 27.1961
OS_AL: 27.0033

## 2022-11-10 ASSESSMENT — VISUAL ACUITY
OD_BCVA: 20/200
OS_BCVA: 20/30

## 2022-11-10 ASSESSMENT — REFRACTION_AUTOREFRACTION
OD_AXIS: 084
OS_SPHERE: -5.50
OS_CYLINDER: -1.00
OS_AXIS: 096
OD_SPHERE: +1.25
OD_CYLINDER: -1.25

## 2022-11-10 ASSESSMENT — REFRACTION_MANIFEST
OS_CYLINDER: -0.75
OS_AXIS: 081
OS_SPHERE: -6.00
OD_CYLINDER: -2.25
OD_AXIS: 130
OD_SPHERE: -7.25

## 2022-11-10 ASSESSMENT — SPHEQUIV_DERIVED
OD_SPHEQUIV: -8.375
OS_SPHEQUIV: -6.375
OS_SPHEQUIV: -6
OD_SPHEQUIV: 0.625

## 2022-11-10 ASSESSMENT — CONFRONTATIONAL VISUAL FIELD TEST (CVF)
OS_FINDINGS: FULL
OD_FINDINGS: FULL

## 2022-11-10 ASSESSMENT — TONOMETRY
OD_IOP_MMHG: 16
OS_IOP_MMHG: 13

## 2022-11-29 ENCOUNTER — LABORATORY RESULT (OUTPATIENT)
Age: 62
End: 2022-11-29

## 2022-11-29 ENCOUNTER — APPOINTMENT (OUTPATIENT)
Dept: FAMILY MEDICINE | Facility: CLINIC | Age: 62
End: 2022-11-29

## 2022-11-29 VITALS
DIASTOLIC BLOOD PRESSURE: 71 MMHG | SYSTOLIC BLOOD PRESSURE: 112 MMHG | HEART RATE: 68 BPM | OXYGEN SATURATION: 98 % | TEMPERATURE: 96 F | WEIGHT: 199 LBS | HEIGHT: 74 IN | BODY MASS INDEX: 25.54 KG/M2

## 2022-11-29 DIAGNOSIS — Z13.228 ENCOUNTER FOR SCREENING FOR OTHER SUSPECTED ENDOCRINE DISORDER: ICD-10-CM

## 2022-11-29 DIAGNOSIS — Z13.0 ENCOUNTER FOR SCREENING FOR OTHER SUSPECTED ENDOCRINE DISORDER: ICD-10-CM

## 2022-11-29 DIAGNOSIS — Z01.818 ENCOUNTER FOR OTHER PREPROCEDURAL EXAMINATION: ICD-10-CM

## 2022-11-29 DIAGNOSIS — H25.092 OTHER AGE-RELATED INCIPIENT CATARACT, LEFT EYE: ICD-10-CM

## 2022-11-29 DIAGNOSIS — Z12.5 ENCOUNTER FOR SCREENING FOR MALIGNANT NEOPLASM OF PROSTATE: ICD-10-CM

## 2022-11-29 DIAGNOSIS — Z13.29 ENCOUNTER FOR SCREENING FOR OTHER SUSPECTED ENDOCRINE DISORDER: ICD-10-CM

## 2022-11-29 PROCEDURE — 99214 OFFICE O/P EST MOD 30 MIN: CPT

## 2022-11-29 RX ORDER — SILDENAFIL 20 MG/1
20 TABLET ORAL
Qty: 90 | Refills: 0 | Status: ACTIVE | COMMUNITY
Start: 2022-03-31 | End: 1900-01-01

## 2022-11-29 NOTE — HISTORY OF PRESENT ILLNESS
[No Pertinent Cardiac History] : no history of aortic stenosis, atrial fibrillation, coronary artery disease, recent myocardial infarction, or implantable device/pacemaker [No Pertinent Pulmonary History] : no history of asthma, COPD, sleep apnea, or smoking [No Adverse Anesthesia Reaction] : no adverse anesthesia reaction in self or family member [(Patient denies any chest pain, claudication, dyspnea on exertion, orthopnea, palpitations or syncope)] : Patient denies any chest pain, claudication, dyspnea on exertion, orthopnea, palpitations or syncope [Excellent (>10 METs)] : Excellent (>10 METs) [Chronic Anticoagulation] : no chronic anticoagulation [Chronic Kidney Disease] : no chronic kidney disease [Diabetes] : no diabetes [FreeTextEntry1] : L cataract extraction and intraocular lens placement  [FreeTextEntry2] : 12/6/22 [FreeTextEntry3] : Dr. Connor [FreeTextEntry4] : Pt in office for medical clearance. Pt to go for procedure for cataract extraction and intraocular lens placement. Pt denies chest pain, palpitations, dyspnea, fever, chills, nausea, or vomiting.\par \par Pt hax hx of low testosterone, is considering supplementation and would like to check testosterone levels. ALso to check lyme screen as he has hx of lyme meningitis\par Pt f/u insomnia, BPH. Pt has tried klonopin in the past for insomnia which has helped. He did not tolerate ambien well. Pt using melatonins qhs which has been relatively effective. BPH contorlled on sildenafil, needs refill

## 2022-11-29 NOTE — ASSESSMENT
[Patient Optimized for Surgery] : Patient optimized for surgery [FreeTextEntry4] : Pt is medically optimized for procedure at this time.\par Fax to [facility]\par \par BPH - refill sildenafil\par insomnia - cont melatonin. klonopin qhs prn\par low testosterone - recheck level\par hx lyme dz - recheck lyme screen

## 2022-12-01 ENCOUNTER — OFFICE (OUTPATIENT)
Dept: URBAN - METROPOLITAN AREA CLINIC 12 | Facility: CLINIC | Age: 62
Setting detail: OPHTHALMOLOGY
End: 2022-12-01
Payer: COMMERCIAL

## 2022-12-01 DIAGNOSIS — Z01.812: ICD-10-CM

## 2022-12-01 DIAGNOSIS — Z20.822: ICD-10-CM

## 2022-12-01 PROBLEM — H40.013 GLAUCOMA SUSPECT, LOW RISK 1-2 FACTORS; BOTH EYES: Status: ACTIVE | Noted: 2022-10-10

## 2022-12-01 PROCEDURE — 99211 OFF/OP EST MAY X REQ PHY/QHP: CPT | Performed by: OPHTHALMOLOGY

## 2022-12-01 ASSESSMENT — REFRACTION_AUTOREFRACTION
OS_CYLINDER: -1.00
OD_AXIS: 084
OD_SPHERE: +1.25
OS_SPHERE: -5.50
OS_AXIS: 096
OD_CYLINDER: -1.25

## 2022-12-01 ASSESSMENT — REFRACTION_MANIFEST
OS_AXIS: 081
OS_CYLINDER: -0.75
OS_SPHERE: -6.00
OD_AXIS: 130
OD_CYLINDER: -2.25
OD_SPHERE: -7.25

## 2022-12-01 ASSESSMENT — KERATOMETRY
OS_K1POWER_DIOPTERS: 41.25
OD_K2POWER_DIOPTERS: 42.25
METHOD_AUTO_MANUAL: AUTO
OS_AXISANGLE_DEGREES: 005
OD_K1POWER_DIOPTERS: 41.25
OS_K2POWER_DIOPTERS: 42.25
OD_AXISANGLE_DEGREES: 164

## 2022-12-01 ASSESSMENT — SPHEQUIV_DERIVED
OD_SPHEQUIV: 0.625
OS_SPHEQUIV: -6
OS_SPHEQUIV: -6.375
OD_SPHEQUIV: -8.375

## 2022-12-01 ASSESSMENT — AXIALLENGTH_DERIVED
OD_AL: 28.2725
OS_AL: 27.1961
OD_AL: 23.9981
OS_AL: 27.0033

## 2022-12-01 ASSESSMENT — VISUAL ACUITY
OD_BCVA: 20/200
OS_BCVA: 20/30

## 2022-12-06 ENCOUNTER — AMBULATORY SURGERY CENTER (OUTPATIENT)
Dept: URBAN - METROPOLITAN AREA SURGERY 27 | Facility: SURGERY | Age: 62
Setting detail: OPHTHALMOLOGY
End: 2022-12-06
Payer: COMMERCIAL

## 2022-12-06 DIAGNOSIS — H25.12: ICD-10-CM

## 2022-12-06 DIAGNOSIS — H52.212: ICD-10-CM

## 2022-12-06 PROCEDURE — 66984 XCAPSL CTRC RMVL W/O ECP: CPT | Performed by: OPHTHALMOLOGY

## 2022-12-06 PROCEDURE — FEMTO CATARACT LASER: Performed by: OPHTHALMOLOGY

## 2022-12-07 ENCOUNTER — OFFICE (OUTPATIENT)
Dept: URBAN - METROPOLITAN AREA CLINIC 12 | Facility: CLINIC | Age: 62
Setting detail: OPHTHALMOLOGY
End: 2022-12-07
Payer: COMMERCIAL

## 2022-12-07 ENCOUNTER — RX ONLY (RX ONLY)
Age: 62
End: 2022-12-07

## 2022-12-07 DIAGNOSIS — Z96.1: ICD-10-CM

## 2022-12-07 PROCEDURE — 99024 POSTOP FOLLOW-UP VISIT: CPT | Performed by: OPTOMETRIST

## 2022-12-07 ASSESSMENT — KERATOMETRY
METHOD_AUTO_MANUAL: AUTO
OS_AXISANGLE_DEGREES: 180
OD_K1POWER_DIOPTERS: 41.75
OD_K2POWER_DIOPTERS: 42.50
OS_K2POWER_DIOPTERS: 41.75
OD_AXISANGLE_DEGREES: 003
OS_K1POWER_DIOPTERS: 40.75

## 2022-12-07 ASSESSMENT — REFRACTION_AUTOREFRACTION
OD_SPHERE: +0.75
OS_CYLINDER: -0.50
OS_SPHERE: -0.25
OD_AXIS: 093
OD_CYLINDER: -1.25
OS_AXIS: 075

## 2022-12-07 ASSESSMENT — REFRACTION_MANIFEST
OS_SPHERE: -6.00
OD_AXIS: 130
OS_CYLINDER: -0.75
OD_SPHERE: -7.25
OD_CYLINDER: -2.25
OS_AXIS: 081

## 2022-12-07 ASSESSMENT — AXIALLENGTH_DERIVED
OD_AL: 24.0573
OD_AL: 28.0761
OS_AL: 27.4423
OS_AL: 24.6594

## 2022-12-07 ASSESSMENT — SPHEQUIV_DERIVED
OD_SPHEQUIV: 0.125
OS_SPHEQUIV: -0.5
OD_SPHEQUIV: -8.375
OS_SPHEQUIV: -6.375

## 2022-12-07 ASSESSMENT — CONFRONTATIONAL VISUAL FIELD TEST (CVF)
OS_FINDINGS: FULL
OD_FINDINGS: FULL

## 2022-12-07 ASSESSMENT — VISUAL ACUITY
OS_BCVA: 20/40
OD_BCVA: 20/30-2

## 2022-12-07 ASSESSMENT — TONOMETRY
OS_IOP_MMHG: 15
OD_IOP_MMHG: 13

## 2022-12-12 LAB
ALBUMIN SERPL ELPH-MCNC: 4.9 G/DL
ALP BLD-CCNC: 42 U/L
ALT SERPL-CCNC: 22 U/L
ANION GAP SERPL CALC-SCNC: 11 MMOL/L
APPEARANCE: CLEAR
AST SERPL-CCNC: 18 U/L
BACTERIA: NEGATIVE
BASOPHILS # BLD AUTO: 0.09 K/UL
BASOPHILS NFR BLD AUTO: 1.2 %
BILIRUB SERPL-MCNC: 0.7 MG/DL
BILIRUBIN URINE: NEGATIVE
BLOOD URINE: NEGATIVE
BUN SERPL-MCNC: 18 MG/DL
CALCIUM SERPL-MCNC: 9.4 MG/DL
CHLORIDE SERPL-SCNC: 102 MMOL/L
CHOLEST SERPL-MCNC: 211 MG/DL
CO2 SERPL-SCNC: 27 MMOL/L
COLOR: NORMAL
CREAT SERPL-MCNC: 0.95 MG/DL
EGFR: 90 ML/MIN/1.73M2
EOSINOPHIL # BLD AUTO: 0.24 K/UL
EOSINOPHIL NFR BLD AUTO: 3.3 %
ESTIMATED AVERAGE GLUCOSE: 123 MG/DL
GLUCOSE QUALITATIVE U: NEGATIVE
GLUCOSE SERPL-MCNC: 103 MG/DL
HBA1C MFR BLD HPLC: 5.9 %
HCT VFR BLD CALC: 44.9 %
HDLC SERPL-MCNC: 56 MG/DL
HGB BLD-MCNC: 14.5 G/DL
HYALINE CASTS: 0 /LPF
IMM GRANULOCYTES NFR BLD AUTO: 0.5 %
KETONES URINE: NEGATIVE
LDLC SERPL CALC-MCNC: 137 MG/DL
LEUKOCYTE ESTERASE URINE: NEGATIVE
LYMPHOCYTES # BLD AUTO: 2.1 K/UL
LYMPHOCYTES NFR BLD AUTO: 28.5 %
MAN DIFF?: NORMAL
MCHC RBC-ENTMCNC: 22.6 PG
MCHC RBC-ENTMCNC: 32.3 GM/DL
MCV RBC AUTO: 70 FL
MICROSCOPIC-UA: NORMAL
MONOCYTES # BLD AUTO: 0.68 K/UL
MONOCYTES NFR BLD AUTO: 9.2 %
NEUTROPHILS # BLD AUTO: 4.23 K/UL
NEUTROPHILS NFR BLD AUTO: 57.3 %
NITRITE URINE: NEGATIVE
NONHDLC SERPL-MCNC: 155 MG/DL
PH URINE: 6.5
PLATELET # BLD AUTO: 222 K/UL
POTASSIUM SERPL-SCNC: 5.2 MMOL/L
PROT SERPL-MCNC: 7.5 G/DL
PROTEIN URINE: NEGATIVE
PSA SERPL-MCNC: 1.71 NG/ML
RBC # BLD: 6.41 M/UL
RBC # FLD: 17.9 %
RED BLOOD CELLS URINE: 1 /HPF
SODIUM SERPL-SCNC: 141 MMOL/L
SPECIFIC GRAVITY URINE: 1.02
SQUAMOUS EPITHELIAL CELLS: 0 /HPF
T4 FREE SERPL-MCNC: 1 NG/DL
TESTOST SERPL-MCNC: 305 NG/DL
TRIGL SERPL-MCNC: 90 MG/DL
TSH SERPL-ACNC: 3.8 UIU/ML
UROBILINOGEN URINE: NORMAL
WBC # FLD AUTO: 7.38 K/UL
WHITE BLOOD CELLS URINE: 1 /HPF

## 2022-12-30 ENCOUNTER — APPOINTMENT (OUTPATIENT)
Dept: FAMILY MEDICINE | Facility: CLINIC | Age: 62
End: 2022-12-30
Payer: COMMERCIAL

## 2022-12-30 VITALS
TEMPERATURE: 97.6 F | DIASTOLIC BLOOD PRESSURE: 76 MMHG | BODY MASS INDEX: 25.15 KG/M2 | HEIGHT: 74 IN | HEART RATE: 76 BPM | SYSTOLIC BLOOD PRESSURE: 126 MMHG | OXYGEN SATURATION: 98 % | WEIGHT: 196 LBS

## 2022-12-30 DIAGNOSIS — H60.91 UNSPECIFIED OTITIS EXTERNA, RIGHT EAR: ICD-10-CM

## 2022-12-30 PROCEDURE — 99214 OFFICE O/P EST MOD 30 MIN: CPT

## 2022-12-30 NOTE — HISTORY OF PRESENT ILLNESS
[FreeTextEntry8] : Pt c/o R ear pain x 3 days. Pain is constant. Denies fever, chills. Pt states he satrted doxycycline course 2 days ago.

## 2022-12-30 NOTE — ASSESSMENT
[FreeTextEntry1] : R otitis externa - cont doxycycline 100mg bid if no improvement consider switchin antibx and/or doing imaging\par hypogonadism- most recent testosterone level wnl but has had chronic low testosterone in past. Advised pt this may be a transient increase, may repeat in the future

## 2022-12-30 NOTE — PHYSICAL EXAM
[Normal Outer Ear/Nose] : the outer ears and nose were normal in appearance [Normal TMs] : both tympanic membranes were normal [de-identified] : tenderness on R mastoid, posterior R ear. No swelling or erythema noted

## 2023-01-03 RX ORDER — AZITHROMYCIN 250 MG/1
250 TABLET, FILM COATED ORAL
Qty: 1 | Refills: 0 | Status: COMPLETED | COMMUNITY
Start: 2021-01-07 | End: 2023-01-03

## 2023-03-03 ENCOUNTER — APPOINTMENT (OUTPATIENT)
Dept: MRI IMAGING | Facility: CLINIC | Age: 63
End: 2023-03-03

## 2023-06-20 ENCOUNTER — APPOINTMENT (OUTPATIENT)
Dept: INTERNAL MEDICINE | Facility: CLINIC | Age: 63
End: 2023-06-20
Payer: COMMERCIAL

## 2023-06-20 VITALS
BODY MASS INDEX: 25.41 KG/M2 | WEIGHT: 198 LBS | HEIGHT: 74 IN | SYSTOLIC BLOOD PRESSURE: 110 MMHG | HEART RATE: 70 BPM | OXYGEN SATURATION: 97 % | TEMPERATURE: 98.3 F | DIASTOLIC BLOOD PRESSURE: 72 MMHG

## 2023-06-20 DIAGNOSIS — M50.20 OTHER CERVICAL DISC DISPLACEMENT, UNSPECIFIED CERVICAL REGION: ICD-10-CM
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
## 2023-06-20 PROCEDURE — 99213 OFFICE O/P EST LOW 20 MIN: CPT

## 2023-06-20 RX ORDER — BETAMETHASONE DIPROPIONATE 0.5 MG/G
0.05 CREAM TOPICAL
Qty: 1 | Refills: 0 | Status: DISCONTINUED | COMMUNITY
Start: 2021-05-20 | End: 2023-06-20

## 2023-06-20 RX ORDER — DOXYCYCLINE HYCLATE 100 MG/1
100 CAPSULE ORAL
Qty: 42 | Refills: 0 | Status: DISCONTINUED | COMMUNITY
Start: 2021-08-12 | End: 2023-06-20

## 2023-06-20 RX ORDER — LEVOFLOXACIN 500 MG/1
500 TABLET, FILM COATED ORAL DAILY
Qty: 7 | Refills: 0 | Status: DISCONTINUED | COMMUNITY
Start: 2023-01-03 | End: 2023-06-20

## 2023-06-20 RX ORDER — CLONAZEPAM 0.5 MG/1
0.5 TABLET ORAL
Qty: 60 | Refills: 0 | Status: DISCONTINUED | COMMUNITY
Start: 2022-10-18 | End: 2023-06-20

## 2023-06-20 RX ORDER — METHYLPREDNISOLONE 4 MG/1
4 TABLET ORAL
Qty: 1 | Refills: 0 | Status: DISCONTINUED | COMMUNITY
Start: 2021-01-07 | End: 2023-06-20

## 2023-06-20 RX ORDER — MELOXICAM 15 MG/1
TABLET ORAL
Refills: 0 | Status: DISCONTINUED | COMMUNITY
End: 2023-06-20

## 2023-06-20 RX ORDER — ALBUTEROL SULFATE 90 UG/1
108 (90 BASE) INHALANT RESPIRATORY (INHALATION)
Qty: 1 | Refills: 3 | Status: DISCONTINUED | COMMUNITY
Start: 2021-01-07 | End: 2023-06-20

## 2023-06-20 RX ORDER — MECLIZINE HYDROCHLORIDE 25 MG/1
25 TABLET ORAL 3 TIMES DAILY
Qty: 30 | Refills: 0 | Status: DISCONTINUED | COMMUNITY
Start: 2022-03-31 | End: 2023-06-20

## 2023-06-20 NOTE — PHYSICAL EXAM
[Normal Sclera/Conjunctiva] : normal sclera/conjunctiva [Normal] : normal rate, regular rhythm, normal S1 and S2 and no murmur heard [No Edema] : there was no peripheral edema [Speech Grossly Normal] : speech grossly normal [Memory Grossly Normal] : memory grossly normal [Normal Affect] : the affect was normal [Normal Mood] : the mood was normal

## 2023-06-20 NOTE — HISTORY OF PRESENT ILLNESS
[FreeTextEntry8] : BHANU GUAMAN is a 63 year M who presents today with complaints of pulling 9 ticks in the last few days. He has a history of lyme meningititis in 2021- Because he continues to test positive for Lyme disease (both IGM & IGG) as of 6 mos ago, he was advised by his PCP to be treated for Lyme whenever he has bitten by a tick. He did not tolerate Doxy and so he has been treated with Zithromax. He reports a remote PCN allergy but did tolerate 30 days of Ceftriaxone for initial therapy.

## 2023-06-20 NOTE — HEALTH RISK ASSESSMENT
[Yes] : Yes [2 - 4 times a month (2 pts)] : 2-4 times a month (2 points) [1 or 2 (0 pts)] : 1 or 2 (0 points) [Never (0 pts)] : Never (0 points) [No] : In the past 12 months have you used drugs other than those required for medical reasons? No [0] : 2) Feeling down, depressed, or hopeless: Not at all (0) [PHQ-2 Negative - No further assessment needed] : PHQ-2 Negative - No further assessment needed [Never] : Never [Audit-CScore] : 2 [[id number]] : 0

## 2023-07-25 ENCOUNTER — APPOINTMENT (OUTPATIENT)
Dept: FAMILY MEDICINE | Facility: CLINIC | Age: 63
End: 2023-07-25
Payer: COMMERCIAL

## 2023-07-25 ENCOUNTER — LABORATORY RESULT (OUTPATIENT)
Age: 63
End: 2023-07-25

## 2023-07-25 VITALS
HEIGHT: 74 IN | WEIGHT: 198 LBS | OXYGEN SATURATION: 97 % | BODY MASS INDEX: 25.41 KG/M2 | DIASTOLIC BLOOD PRESSURE: 82 MMHG | SYSTOLIC BLOOD PRESSURE: 120 MMHG | HEART RATE: 72 BPM | TEMPERATURE: 98.4 F

## 2023-07-25 DIAGNOSIS — Z86.19 PERSONAL HISTORY OF OTHER INFECTIOUS AND PARASITIC DISEASES: ICD-10-CM

## 2023-07-25 DIAGNOSIS — A69.21 MENINGITIS DUE TO LYME DISEASE: ICD-10-CM

## 2023-07-25 PROCEDURE — 99214 OFFICE O/P EST MOD 30 MIN: CPT

## 2023-07-25 NOTE — ASSESSMENT
[FreeTextEntry1] : refilled meds for poss lyme \par check labs for other causes of joint pain \par cont to monitor \par

## 2023-07-25 NOTE — REVIEW OF SYSTEMS
[Fatigue] : fatigue [Joint Pain] : joint pain [Joint Stiffness] : joint stiffness [Muscle Pain] : muscle pain [Back Pain] : no back pain [Joint Swelling] : joint swelling [Negative] : Respiratory

## 2023-07-25 NOTE — HISTORY OF PRESENT ILLNESS
[FreeTextEntry8] : PT presenting for concern of lymes disease\par HX of lyme meningitis 2 years ago, \par started feeling better approx 9 months ago\par Having recurrent joint pain in his b/l thumbs. 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

## 2023-07-28 LAB
ALBUMIN SERPL ELPH-MCNC: 4.9 G/DL
ALP BLD-CCNC: 50 U/L
ALT SERPL-CCNC: 18 U/L
ANION GAP SERPL CALC-SCNC: 18 MMOL/L
APPEARANCE: CLEAR
AST SERPL-CCNC: 17 U/L
B BURGDOR AB SER-IMP: NEGATIVE
B BURGDOR IGG+IGM SER QL: 0.85 INDEX
BILIRUB SERPL-MCNC: 0.8 MG/DL
BILIRUBIN URINE: NEGATIVE
BLOOD URINE: NEGATIVE
BUN SERPL-MCNC: 20 MG/DL
CALCIUM SERPL-MCNC: 9.2 MG/DL
CCP AB SER IA-ACNC: <8 UNITS
CHLORIDE SERPL-SCNC: 100 MMOL/L
CO2 SERPL-SCNC: 22 MMOL/L
COLOR: YELLOW
CREAT SERPL-MCNC: 0.88 MG/DL
DSDNA AB SER-ACNC: <12 IU/ML
EGFR: 96 ML/MIN/1.73M2
ERYTHROCYTE [SEDIMENTATION RATE] IN BLOOD BY WESTERGREN METHOD: 12 MM/HR
FOLATE SERPL-MCNC: >20 NG/ML
GLUCOSE QUALITATIVE U: NEGATIVE MG/DL
GLUCOSE SERPL-MCNC: 97 MG/DL
IRON SATN MFR SERPL: 30 %
IRON SERPL-MCNC: 102 UG/DL
KETONES URINE: NEGATIVE MG/DL
LEUKOCYTE ESTERASE URINE: NEGATIVE
NITRITE URINE: NEGATIVE
PH URINE: 6.5
POTASSIUM SERPL-SCNC: 4.4 MMOL/L
PROT SERPL-MCNC: 7.4 G/DL
PROTEIN URINE: NORMAL MG/DL
R RICKETTSI IGG CSF-ACNC: NEGATIVE
R RICKETTSI IGM CSF-ACNC: 0.35 INDEX
RF+CCP IGG SER-IMP: NEGATIVE
SODIUM SERPL-SCNC: 141 MMOL/L
SPECIFIC GRAVITY URINE: 1.02
T4 FREE SERPL-MCNC: 1 NG/DL
TIBC SERPL-MCNC: 343 UG/DL
TSH SERPL-ACNC: 1.88 UIU/ML
UIBC SERPL-MCNC: 241 UG/DL
UROBILINOGEN URINE: 0.2 MG/DL
VIT B12 SERPL-MCNC: 682 PG/ML

## 2023-08-01 LAB
A PHAGO GROEL BLD QL NAA+NON-PROBE: NEGATIVE
A PHAGOCYTOPH IGG TITR SER IF: NORMAL TITER
B BURGDOR AB SER QL IA: POSITIVE
B MICROTI IGG TITR SER: NORMAL TITER
E CANIS+EWIN GROEL BLD QL NAA+NON-PROBE: NEGATIVE
E CHAFF GROEL BLD QL NAA+NON-PROBE: NEGATIVE
E CHAFFEENSIS IGG TITR SER IF: NORMAL TITER
E MURIS EAUCL GROEL BLD QL NAA+NON-PRB: NEGATIVE

## 2024-02-01 ENCOUNTER — APPOINTMENT (OUTPATIENT)
Dept: FAMILY MEDICINE | Facility: CLINIC | Age: 64
End: 2024-02-01
Payer: COMMERCIAL

## 2024-02-01 ENCOUNTER — NON-APPOINTMENT (OUTPATIENT)
Age: 64
End: 2024-02-01

## 2024-02-01 VITALS
SYSTOLIC BLOOD PRESSURE: 110 MMHG | WEIGHT: 197 LBS | BODY MASS INDEX: 25.28 KG/M2 | DIASTOLIC BLOOD PRESSURE: 74 MMHG | HEART RATE: 75 BPM | OXYGEN SATURATION: 98 % | TEMPERATURE: 97.6 F | HEIGHT: 74 IN

## 2024-02-01 DIAGNOSIS — Z13.220 ENCOUNTER FOR SCREENING FOR LIPOID DISORDERS: ICD-10-CM

## 2024-02-01 DIAGNOSIS — N13.8 BENIGN PROSTATIC HYPERPLASIA WITH LOWER URINARY TRACT SYMPMS: ICD-10-CM

## 2024-02-01 DIAGNOSIS — H60.501 UNSPECIFIED ACUTE NONINFECTIVE OTITIS EXTERNA, RIGHT EAR: ICD-10-CM

## 2024-02-01 DIAGNOSIS — E29.1 TESTICULAR HYPOFUNCTION: ICD-10-CM

## 2024-02-01 DIAGNOSIS — Z00.00 ENCOUNTER FOR GENERAL ADULT MEDICAL EXAMINATION W/OUT ABNORMAL FINDINGS: ICD-10-CM

## 2024-02-01 DIAGNOSIS — R79.89 OTHER SPECIFIED ABNORMAL FINDINGS OF BLOOD CHEMISTRY: ICD-10-CM

## 2024-02-01 DIAGNOSIS — N40.1 BENIGN PROSTATIC HYPERPLASIA WITH LOWER URINARY TRACT SYMPMS: ICD-10-CM

## 2024-02-01 PROCEDURE — 93000 ELECTROCARDIOGRAM COMPLETE: CPT

## 2024-02-01 PROCEDURE — 99214 OFFICE O/P EST MOD 30 MIN: CPT | Mod: 25

## 2024-02-01 PROCEDURE — 99396 PREV VISIT EST AGE 40-64: CPT

## 2024-02-01 RX ORDER — TADALAFIL 5 MG/1
5 TABLET ORAL
Qty: 90 | Refills: 0 | Status: COMPLETED | COMMUNITY
Start: 2020-06-30 | End: 2024-02-01

## 2024-02-01 RX ORDER — CEFUROXIME AXETIL 500 MG/1
500 TABLET ORAL
Qty: 28 | Refills: 0 | Status: COMPLETED | COMMUNITY
Start: 2023-06-20 | End: 2024-02-01

## 2024-02-01 RX ORDER — SILDENAFIL 50 MG/1
50 TABLET ORAL
Qty: 60 | Refills: 2 | Status: ACTIVE | COMMUNITY
Start: 2020-09-22 | End: 1900-01-01

## 2024-02-01 RX ORDER — NEOMYCIN SULFATE, POLYMYXIN B SULFATE, HYDROCORTISONE 3.5; 10000; 1 MG/ML; [USP'U]/ML; MG/ML
1 SOLUTION/ DROPS AURICULAR (OTIC) 4 TIMES DAILY
Qty: 1 | Refills: 0 | Status: ACTIVE | COMMUNITY
Start: 2024-02-01 | End: 1900-01-01

## 2024-02-01 RX ORDER — DIAZEPAM 10 MG/1
10 TABLET ORAL
Qty: 60 | Refills: 0 | Status: ACTIVE | COMMUNITY
Start: 2020-02-25 | End: 1900-01-01

## 2024-02-01 NOTE — ASSESSMENT
[FreeTextEntry1] : L shoulder pain, neck spasms - diazepam prn.  checked\par  BPH - check PSA, refer to uro\par  dermatitis of penis - trial of topical betamethasone x 1 week. if no improvement see derm\par  tick bite of neck - tick borne disease panel ordered\par  Healthy diet and regular exercise regimen discussed w/ pt.\par  Screening labs ordered\par  tdap administered, pt consent given before administration and after discussion of risks/benefits, pt tolerated well\par  Any questions call office
no radiation

## 2024-02-01 NOTE — HISTORY OF PRESENT ILLNESS
[de-identified] : Pt in office for CPE. Pt has been feeling well.  Pt has hx of surgical repair of L tricep and R quad repair in 3/2/20. Pt s/p L rotator cuff, biceps and L labrum repair in 02/2022. Pt also has chronic neck pain. Pt on valium prn at night to help w/ spasms.  Pt has hx of BPH, uses sildenafil Pt has hx of Lyme meningitis in 09/2021 Pt sees cardio Dr. Triplett once a year for hx cardiac arrhythmia, palpitations.  Symptoms improved after starting magnesium supplement.  Denies CP, palpitations, dyspnea, n/v. Nonsmoker ETOH use social  Drug use denies Exercises 5x a week weights or biking Works as real estate company owner

## 2024-02-01 NOTE — HEALTH RISK ASSESSMENT
[0] : 2) Feeling down, depressed, or hopeless: Not at all (0) [PHQ-2 Negative - No further assessment needed] : PHQ-2 Negative - No further assessment needed [[id number]] : 0 [Patient reported colonoscopy was normal] : Patient reported colonoscopy was normal [ColonoscopyDate] : 01/23 [ColonoscopyComments] : repeat in 5 yrs [Never] : Never

## 2024-02-03 LAB
ALBUMIN SERPL ELPH-MCNC: 5 G/DL
ALP BLD-CCNC: 43 U/L
ALT SERPL-CCNC: 22 U/L
ANION GAP SERPL CALC-SCNC: 13 MMOL/L
APPEARANCE: CLEAR
AST SERPL-CCNC: 19 U/L
BACTERIA: NEGATIVE /HPF
BASOPHILS # BLD AUTO: 0.08 K/UL
BASOPHILS NFR BLD AUTO: 1.1 %
BILIRUB SERPL-MCNC: 1 MG/DL
BILIRUBIN URINE: NEGATIVE
BLOOD URINE: NEGATIVE
BUN SERPL-MCNC: 23 MG/DL
CALCIUM SERPL-MCNC: 9.4 MG/DL
CAST: 0 /LPF
CHLORIDE SERPL-SCNC: 101 MMOL/L
CHOLEST SERPL-MCNC: 217 MG/DL
CO2 SERPL-SCNC: 27 MMOL/L
COLOR: YELLOW
CREAT SERPL-MCNC: 0.97 MG/DL
EGFR: 87 ML/MIN/1.73M2
EOSINOPHIL # BLD AUTO: 0.17 K/UL
EOSINOPHIL NFR BLD AUTO: 2.2 %
EPITHELIAL CELLS: 0 /HPF
ESTIMATED AVERAGE GLUCOSE: 120 MG/DL
GLUCOSE QUALITATIVE U: NEGATIVE MG/DL
GLUCOSE SERPL-MCNC: 93 MG/DL
HBA1C MFR BLD HPLC: 5.8 %
HCT VFR BLD CALC: 43.2 %
HDLC SERPL-MCNC: 54 MG/DL
HGB BLD-MCNC: 13.4 G/DL
IMM GRANULOCYTES NFR BLD AUTO: 0.4 %
KETONES URINE: ABNORMAL MG/DL
LDLC SERPL CALC-MCNC: 150 MG/DL
LEUKOCYTE ESTERASE URINE: NEGATIVE
LYMPHOCYTES # BLD AUTO: 2.28 K/UL
LYMPHOCYTES NFR BLD AUTO: 30 %
MAN DIFF?: NORMAL
MCHC RBC-ENTMCNC: 22.1 PG
MCHC RBC-ENTMCNC: 31 GM/DL
MCV RBC AUTO: 71.3 FL
MICROSCOPIC-UA: NORMAL
MONOCYTES # BLD AUTO: 0.64 K/UL
MONOCYTES NFR BLD AUTO: 8.4 %
NEUTROPHILS # BLD AUTO: 4.41 K/UL
NEUTROPHILS NFR BLD AUTO: 57.9 %
NITRITE URINE: NEGATIVE
NONHDLC SERPL-MCNC: 163 MG/DL
PH URINE: 6
PLATELET # BLD AUTO: 221 K/UL
POTASSIUM SERPL-SCNC: 4.6 MMOL/L
PROT SERPL-MCNC: 7.6 G/DL
PROTEIN URINE: NEGATIVE MG/DL
PSA SERPL-MCNC: 1.25 NG/ML
RBC # BLD: 6.06 M/UL
RBC # FLD: 17.2 %
RED BLOOD CELLS URINE: 0 /HPF
SODIUM SERPL-SCNC: 140 MMOL/L
SPECIFIC GRAVITY URINE: 1.02
T4 FREE SERPL-MCNC: 1.2 NG/DL
TESTOST SERPL-MCNC: 171 NG/DL
TRIGL SERPL-MCNC: 75 MG/DL
TSH SERPL-ACNC: 2.88 UIU/ML
UROBILINOGEN URINE: 0.2 MG/DL
WBC # FLD AUTO: 7.61 K/UL
WHITE BLOOD CELLS URINE: 1 /HPF

## 2024-03-25 ENCOUNTER — APPOINTMENT (OUTPATIENT)
Dept: FAMILY MEDICINE | Facility: CLINIC | Age: 64
End: 2024-03-25
Payer: MEDICARE

## 2024-03-25 VITALS
TEMPERATURE: 98.2 F | WEIGHT: 198.13 LBS | OXYGEN SATURATION: 98 % | DIASTOLIC BLOOD PRESSURE: 68 MMHG | HEIGHT: 74 IN | SYSTOLIC BLOOD PRESSURE: 100 MMHG | HEART RATE: 83 BPM | BODY MASS INDEX: 25.43 KG/M2

## 2024-03-25 DIAGNOSIS — J06.9 ACUTE UPPER RESPIRATORY INFECTION, UNSPECIFIED: ICD-10-CM

## 2024-03-25 DIAGNOSIS — J04.0 ACUTE LARYNGITIS: ICD-10-CM

## 2024-03-25 PROCEDURE — 99214 OFFICE O/P EST MOD 30 MIN: CPT

## 2024-03-25 RX ORDER — METHYLPREDNISOLONE 4 MG/1
4 TABLET ORAL
Qty: 1 | Refills: 0 | Status: ACTIVE | COMMUNITY
Start: 2024-03-25 | End: 1900-01-01

## 2024-03-25 NOTE — HISTORY OF PRESENT ILLNESS
[FreeTextEntry8] : Pt c/o 2-3 days of chest congestion, voice hoarseness, mild cough. Cough is minimally productive. Pt took motrin and delsym which helped slightly. Denies fever, chills, ear pain, sinus pain, dyspnea, CP.

## 2024-03-25 NOTE — ASSESSMENT
[FreeTextEntry1] : laryngitis, URI - covid/flu pcr ordered. voice rest, salt water gargles, tea w/ honey. if no improvement then trial of medrol mihir

## 2024-03-25 NOTE — PHYSICAL EXAM
[Normal] : affect was normal and insight and judgment were intact [de-identified] : cobblestoning posterior oropharynx, hoarse voice

## 2024-03-26 LAB
INFLUENZA A RESULT: NOT DETECTED
INFLUENZA B RESULT: NOT DETECTED
RESP SYN VIRUS RESULT: NOT DETECTED
SARS-COV-2 RESULT: NOT DETECTED

## 2025-02-10 ENCOUNTER — APPOINTMENT (OUTPATIENT)
Dept: FAMILY MEDICINE | Facility: CLINIC | Age: 65
End: 2025-02-10
Payer: MEDICARE

## 2025-02-10 VITALS
WEIGHT: 196 LBS | DIASTOLIC BLOOD PRESSURE: 80 MMHG | SYSTOLIC BLOOD PRESSURE: 120 MMHG | TEMPERATURE: 98.2 F | HEART RATE: 84 BPM | BODY MASS INDEX: 25.15 KG/M2 | OXYGEN SATURATION: 98 % | HEIGHT: 74 IN

## 2025-02-10 DIAGNOSIS — N13.8 BENIGN PROSTATIC HYPERPLASIA WITH LOWER URINARY TRACT SYMPMS: ICD-10-CM

## 2025-02-10 DIAGNOSIS — G89.29 CERVICALGIA: ICD-10-CM

## 2025-02-10 DIAGNOSIS — E29.1 TESTICULAR HYPOFUNCTION: ICD-10-CM

## 2025-02-10 DIAGNOSIS — M54.2 CERVICALGIA: ICD-10-CM

## 2025-02-10 DIAGNOSIS — N40.1 BENIGN PROSTATIC HYPERPLASIA WITH LOWER URINARY TRACT SYMPMS: ICD-10-CM

## 2025-02-10 DIAGNOSIS — G47.00 INSOMNIA, UNSPECIFIED: ICD-10-CM

## 2025-02-10 DIAGNOSIS — R79.89 OTHER SPECIFIED ABNORMAL FINDINGS OF BLOOD CHEMISTRY: ICD-10-CM

## 2025-02-10 PROCEDURE — 81003 URINALYSIS AUTO W/O SCOPE: CPT | Mod: QW

## 2025-02-10 PROCEDURE — 99214 OFFICE O/P EST MOD 30 MIN: CPT

## 2025-02-16 LAB
ALBUMIN SERPL ELPH-MCNC: 4.6 G/DL
ALP BLD-CCNC: 50 U/L
ALT SERPL-CCNC: 17 U/L
ANION GAP SERPL CALC-SCNC: 13 MMOL/L
APPEARANCE: CLEAR
AST SERPL-CCNC: 19 U/L
BACTERIA: NEGATIVE /HPF
BASOPHILS # BLD AUTO: 0.1 K/UL
BASOPHILS NFR BLD AUTO: 1 %
BILIRUB SERPL-MCNC: 0.6 MG/DL
BILIRUBIN URINE: NEGATIVE
BLOOD URINE: NEGATIVE
BUN SERPL-MCNC: 21 MG/DL
CALCIUM SERPL-MCNC: 9.6 MG/DL
CAST: 0 /LPF
CHLORIDE SERPL-SCNC: 99 MMOL/L
CHOLEST SERPL-MCNC: 214 MG/DL
CO2 SERPL-SCNC: 26 MMOL/L
COLOR: NORMAL
CREAT SERPL-MCNC: 0.92 MG/DL
EGFR: 92 ML/MIN/1.73M2
EOSINOPHIL # BLD AUTO: 0.25 K/UL
EOSINOPHIL NFR BLD AUTO: 2.6 %
EPITHELIAL CELLS: 0 /HPF
ESTIMATED AVERAGE GLUCOSE: 126 MG/DL
GLUCOSE QUALITATIVE U: NEGATIVE MG/DL
GLUCOSE SERPL-MCNC: 91 MG/DL
HBA1C MFR BLD HPLC: 6 %
HCT VFR BLD CALC: 41.5 %
HDLC SERPL-MCNC: 51 MG/DL
HGB BLD-MCNC: 13.4 G/DL
IMM GRANULOCYTES NFR BLD AUTO: 0.6 %
KETONES URINE: NEGATIVE MG/DL
LDLC SERPL CALC-MCNC: 146 MG/DL
LEUKOCYTE ESTERASE URINE: NEGATIVE
LYMPHOCYTES # BLD AUTO: 2.33 K/UL
LYMPHOCYTES NFR BLD AUTO: 24.3 %
MAN DIFF?: NORMAL
MCHC RBC-ENTMCNC: 22.6 PG
MCHC RBC-ENTMCNC: 32.3 G/DL
MCV RBC AUTO: 70 FL
MICROSCOPIC-UA: NORMAL
MONOCYTES # BLD AUTO: 0.74 K/UL
MONOCYTES NFR BLD AUTO: 7.7 %
NEUTROPHILS # BLD AUTO: 6.11 K/UL
NEUTROPHILS NFR BLD AUTO: 63.8 %
NITRITE URINE: NEGATIVE
NONHDLC SERPL-MCNC: 162 MG/DL
PH URINE: 6
PLATELET # BLD AUTO: 252 K/UL
POTASSIUM SERPL-SCNC: 4.6 MMOL/L
PROT SERPL-MCNC: 7.4 G/DL
PROTEIN URINE: NEGATIVE MG/DL
RBC # BLD: 5.93 M/UL
RBC # FLD: 16.7 %
RED BLOOD CELLS URINE: 0 /HPF
SODIUM SERPL-SCNC: 138 MMOL/L
SPECIFIC GRAVITY URINE: 1.02
T4 FREE SERPL-MCNC: 1 NG/DL
TESTOST SERPL-MCNC: 173 NG/DL
TRIGL SERPL-MCNC: 92 MG/DL
TSH SERPL-ACNC: 2.06 UIU/ML
UROBILINOGEN URINE: 0.2 MG/DL
WBC # FLD AUTO: 9.59 K/UL
WHITE BLOOD CELLS URINE: 1 /HPF

## 2025-07-01 ENCOUNTER — APPOINTMENT (OUTPATIENT)
Dept: FAMILY MEDICINE | Facility: CLINIC | Age: 65
End: 2025-07-01
Payer: MEDICARE

## 2025-07-01 VITALS
BODY MASS INDEX: 25.28 KG/M2 | WEIGHT: 197 LBS | SYSTOLIC BLOOD PRESSURE: 122 MMHG | HEART RATE: 77 BPM | OXYGEN SATURATION: 96 % | DIASTOLIC BLOOD PRESSURE: 70 MMHG | TEMPERATURE: 97.5 F | HEIGHT: 74 IN

## 2025-07-01 PROCEDURE — 99214 OFFICE O/P EST MOD 30 MIN: CPT

## 2025-07-01 PROCEDURE — G2211 COMPLEX E/M VISIT ADD ON: CPT

## 2025-07-01 RX ORDER — CEFUROXIME AXETIL 500 MG/1
500 TABLET, FILM COATED ORAL
Qty: 28 | Refills: 0 | Status: ACTIVE | COMMUNITY
Start: 2025-07-01 | End: 1900-01-01

## 2025-09-02 ENCOUNTER — NON-APPOINTMENT (OUTPATIENT)
Age: 65
End: 2025-09-02

## 2025-09-15 ENCOUNTER — NON-APPOINTMENT (OUTPATIENT)
Age: 65
End: 2025-09-15